# Patient Record
Sex: MALE | Race: OTHER | HISPANIC OR LATINO | URBAN - METROPOLITAN AREA
[De-identification: names, ages, dates, MRNs, and addresses within clinical notes are randomized per-mention and may not be internally consistent; named-entity substitution may affect disease eponyms.]

---

## 2022-03-25 ENCOUNTER — EMERGENCY (EMERGENCY)
Facility: HOSPITAL | Age: 52
LOS: 1 days | Discharge: HOME | End: 2022-03-25
Attending: EMERGENCY MEDICINE
Payer: COMMERCIAL

## 2022-03-25 VITALS
HEART RATE: 85 BPM | OXYGEN SATURATION: 99 % | TEMPERATURE: 98 F | SYSTOLIC BLOOD PRESSURE: 138 MMHG | WEIGHT: 197.09 LBS | DIASTOLIC BLOOD PRESSURE: 82 MMHG | RESPIRATION RATE: 18 BRPM | HEIGHT: 71 IN

## 2022-03-25 PROCEDURE — 99284 EMERGENCY DEPT VISIT MOD MDM: CPT

## 2022-03-26 VITALS
RESPIRATION RATE: 16 BRPM | DIASTOLIC BLOOD PRESSURE: 80 MMHG | OXYGEN SATURATION: 99 % | HEART RATE: 84 BPM | TEMPERATURE: 98 F | SYSTOLIC BLOOD PRESSURE: 142 MMHG

## 2022-03-26 PROCEDURE — 99283 EMERGENCY DEPT VISIT LOW MDM: CPT

## 2022-03-26 RX ORDER — ACETAMINOPHEN 500 MG
975 TABLET ORAL ONCE
Refills: 0 | Status: COMPLETED | OUTPATIENT
Start: 2022-03-26 | End: 2022-03-26

## 2022-03-26 RX ADMIN — Medication 1 TABLET(S): at 01:24

## 2022-03-26 RX ADMIN — Medication 975 MILLIGRAM(S): at 01:35

## 2022-03-26 NOTE — ED PROVIDER NOTE - NS ED ROS FT
Constitutional: no fever and no chills  Eyes: no discharge, no pain, no visual changes  ENMT: no ear pain, no dysphagia or throat pain  Neck: no pain, no stiffness  CV: no chest pain, no palpitations, no edema  Resp: no cough, no shortness of breath  Abd: no abdominal pain, no nausea or vomiting, no diarrhea  : no dysuria, no hematuria  MSK: no back pain, (+) neck pain, no joint pain  Neuro: no LOC, no gait abnormality, (+) headache, no sensory deficits, no weakness  Skin: (+) swelling/redness/discharge back of scalp, no rashes, no lacerations

## 2022-03-26 NOTE — ED PROVIDER NOTE - OBJECTIVE STATEMENT
50 y/o M PMH IDDM, HTN, HLD presenting with 3 days of worsening swellling/erythema/pain to lower back scalp. Pt reports he used a new razor to shave his scalp and noticed a pimple to back to scalp which wife has been intermittently squeezing/draining pus. Swelling and pain have continued to worsen over the past 3 days now radiating down neck and a/w mild HA. He denies any fevers/chills, vision changes, chest pain, SOB, abd pain, n/v/d, joint pain, LE swelling, numbness, weakness or other rashes. Has not taken anything for pain at home or any recent antibiotics.

## 2022-03-26 NOTE — ED PROVIDER NOTE - PATIENT PORTAL LINK FT
You can access the FollowMyHealth Patient Portal offered by [facility] by registering at the following website: [email or web address]. By joining [employer]’s FollowMyHealth portal, you will also be able to view your health information using other applications (apps) compatible with our system.

## 2022-03-26 NOTE — ED PROVIDER NOTE - NSFOLLOWUPINSTRUCTIONS_ED_ALL_ED_FT
You were seen in the ER for swelling and pain to the back of your scalp, which is likely due to cellulitis (skin infection). There was no signs of significant collection of pus that could be drained on our exam. You will be discharged home and should continue to take bactrim for 1 week (1 tablet twice per day for 7 days). Continue to try draining pus from this area daily. Return to the ER for any new or worsening pain/swelling to that area despite antibiotics, fevers/chills, chest pain, difficulty breathing, abdominal pain, vomiting/inability to eat, or any other new or concerning symptoms. You were seen in the ER for swelling and pain to the back of your scalp, which is likely due to cellulitis (skin infection). There was no signs of significant collection of pus that could be drained on our exam. You will be discharged home and should continue to take bactrim for 1 week (1 tablet twice per day for 7 days). Continue to try draining pus from this area daily. Return to the ER for any new or worsening pain/swelling to that area despite antibiotics, fevers/chills, chest pain, difficulty breathing, abdominal pain, vomiting/inability to eat, or any other new or concerning symptoms.    Dr. Louis's Note: no visible abscess, no systemic symptoms, no trauma. while exact etiology is unclear, most likely etiology would seem to be cellulitis and will treat as such. pt stable for outpt f/u, return precautions.

## 2022-03-26 NOTE — ED ADULT NURSE NOTE - OBJECTIVE STATEMENT
51y male, AAOx3, breathing even and unlabored, 51y male, AAOx3, breathing even and unlabored, PMH IDDM, HTN, HLD presenting with 3 days of worsening swellling/erythema/pain to lower back scalp. Pt reports he used a new razor to shave his scalp and noticed a pimple to back to scalp which wife has been intermittently squeezing/draining pus. Swelling and pain have continued to worsen over the past 3 days now radiating down neck and a/w mild HA.

## 2022-03-26 NOTE — ED PROVIDER NOTE - PHYSICAL EXAMINATION
PHYSICAL EXAM:  GENERAL: Sitting comfortable in bed, in no acute distress  HENMT: Atraumatic, moist mucous membranes  EYES: Clear bilaterally, PERRL, EOMs intact b/l  HEART: RRR, S1/S2, no murmurs  RESPIRATORY: Clear to auscultation bilaterally, no wheezes/rhonchi/rales  ABDOMEN: +BS, soft, nontender, nondistended  EXTREMITIES: No lower extremity edema, +2 radial pulses b/l  NEURO:  A&Ox4, no focal motor deficits or sensory deficits   Heme/LYMPH: No ecchymosis or bruising, no anterior/posterior cervical or supraclavicular LAD  SKIN: 5x5cm area of induration lower midline posterior scalp tender and warm to touch with central comedome able to express very small amount of purulence, no fluctuance noted. Small comedome posterior to L ear. No evidence of rash.

## 2022-03-26 NOTE — ED PROVIDER NOTE - CLINICAL SUMMARY MEDICAL DECISION MAKING FREE TEXT BOX
50 y/o M PMH IDDM, HTN, HLD presenting with 3 days of worsening swelling/erythema/pain to posterior scalp after shaving. Initially started as a pimple. Afebrile, no systemic symptoms, hemodynamically stable. 5x5cm area of induration/erythema with central comedome, able to express very small amount of purulence on exam. Bedside US showing no drainable fluid collection. Plan to dc home with 1 week course of abx for cellulitis and strict return precautions.

## 2023-08-08 PROBLEM — E11.9 TYPE 2 DIABETES MELLITUS WITHOUT COMPLICATIONS: Chronic | Status: ACTIVE | Noted: 2022-03-26

## 2023-08-08 PROBLEM — E78.5 HYPERLIPIDEMIA, UNSPECIFIED: Chronic | Status: ACTIVE | Noted: 2022-03-26

## 2023-08-08 PROBLEM — I10 ESSENTIAL (PRIMARY) HYPERTENSION: Chronic | Status: ACTIVE | Noted: 2022-03-26

## 2023-08-25 PROBLEM — Z00.00 ENCOUNTER FOR PREVENTIVE HEALTH EXAMINATION: Status: ACTIVE | Noted: 2023-08-25

## 2023-08-30 ENCOUNTER — APPOINTMENT (OUTPATIENT)
Dept: UROLOGY | Facility: CLINIC | Age: 53
End: 2023-08-30
Payer: COMMERCIAL

## 2023-08-30 VITALS
HEART RATE: 88 BPM | SYSTOLIC BLOOD PRESSURE: 92 MMHG | TEMPERATURE: 97.1 F | WEIGHT: 173 LBS | OXYGEN SATURATION: 99 % | BODY MASS INDEX: 24.22 KG/M2 | HEIGHT: 71 IN | DIASTOLIC BLOOD PRESSURE: 62 MMHG

## 2023-08-30 DIAGNOSIS — F17.290 NICOTINE DEPENDENCE, OTHER TOBACCO PRODUCT, UNCOMPLICATED: ICD-10-CM

## 2023-08-30 PROCEDURE — 99205 OFFICE O/P NEW HI 60 MIN: CPT

## 2023-08-30 NOTE — HISTORY OF PRESENT ILLNESS
[FreeTextEntry1] : 08/30/2023: Mr. BOSTON is a 53 year male who presents today for Pyronine's, Ed, Balanoposthitis   Balanoposthitis: secondary to DM and Jardiance.  advised pt importance of keeping area dry and use nystatin.  Fungal infection in the groin and perineum.  Suggested to use Nystatin skin ointment over all the areas. Avoid using oral antibiotic.  Healing wound on the medial aspect of left thigh. Advised to use Neosporin skin ointment locally.   Pyronine's: has been going on for 6 months . Able to have sex. no pain.   ED: Persistent inability to maintain penile erection   Taking Sildenafil 100 mg Helps.  Reeducated pt on effects   O/E: uncircumcised phallus, severe balanoposthitis. adequate meatus, both testes descended, nontender, no mass palpable   RTC: 2 weeks to evaluate effect of meds.

## 2023-08-30 NOTE — REVIEW OF SYSTEMS
[Constipation] : constipation [Seen by urologist before (Name)  ___] : Preciously seen by a urologist: [unfilled] [Wake up at night to urinate  How many times?  ___] : wakes up to urinate [unfilled] times during the night [Strain or push to urinate] : strain or push to urinate [Interrupted urine stream] : interrupted urine stream [Skin Wound] : skin wound [Negative] : Heme/Lymph [see HPI] : see HPI

## 2023-08-30 NOTE — PHYSICAL EXAM
[General Appearance - Well Developed] : well developed [General Appearance - Well Nourished] : well nourished [Normal Appearance] : normal appearance [Well Groomed] : well groomed [General Appearance - In No Acute Distress] : no acute distress [Edema] : no peripheral edema [Respiration, Rhythm And Depth] : normal respiratory rhythm and effort [Exaggerated Use Of Accessory Muscles For Inspiration] : no accessory muscle use [Abdomen Soft] : soft [Abdomen Tenderness] : non-tender [Costovertebral Angle Tenderness] : no ~M costovertebral angle tenderness [Urethral Meatus] : meatus normal [Penis Abnormality] : normal uncircumcised penis [Urinary Bladder Findings] : the bladder was normal on palpation [Scrotum] : the scrotum was normal [Epididymis] : the epididymides were normal [Testes Tenderness] : no tenderness of the testes [Testes Mass (___cm)] : there were no testicular masses [Normal Station and Gait] : the gait and station were normal for the patient's age [] : no rash [FreeTextEntry1] : multiple fungal infection in the groin and skin infections multiple sites [No Focal Deficits] : no focal deficits [Oriented To Time, Place, And Person] : oriented to person, place, and time [Affect] : the affect was normal [Mood] : the mood was normal [Not Anxious] : not anxious [No Palpable Adenopathy] : no palpable adenopathy

## 2023-08-30 NOTE — LETTER BODY
[Dear  ___] : Dear  [unfilled], [Consult Letter:] : I had the pleasure of evaluating your patient, [unfilled]. [Please see my note below.] : Please see my note below. [Consult Closing:] : Thank you very much for allowing me to participate in the care of this patient.  If you have any questions, please do not hesitate to contact me. [Sincerely,] : Sincerely, [FreeTextEntry3] : Salomon Peters MD

## 2023-09-05 ENCOUNTER — NON-APPOINTMENT (OUTPATIENT)
Age: 53
End: 2023-09-05

## 2023-09-14 ENCOUNTER — APPOINTMENT (OUTPATIENT)
Dept: UROLOGY | Facility: CLINIC | Age: 53
End: 2023-09-14
Payer: COMMERCIAL

## 2023-09-14 VITALS
SYSTOLIC BLOOD PRESSURE: 113 MMHG | OXYGEN SATURATION: 99 % | TEMPERATURE: 98 F | HEART RATE: 74 BPM | DIASTOLIC BLOOD PRESSURE: 71 MMHG

## 2023-09-14 DIAGNOSIS — E11.9 TYPE 2 DIABETES MELLITUS W/OUT COMPLICATIONS: ICD-10-CM

## 2023-09-14 DIAGNOSIS — E78.5 HYPERLIPIDEMIA, UNSPECIFIED: ICD-10-CM

## 2023-09-14 PROCEDURE — 99214 OFFICE O/P EST MOD 30 MIN: CPT

## 2023-09-14 RX ORDER — EMPAGLIFLOZIN 25 MG/1
25 TABLET, FILM COATED ORAL
Refills: 0 | Status: DISCONTINUED | COMMUNITY
End: 2023-09-14

## 2023-10-12 ENCOUNTER — APPOINTMENT (OUTPATIENT)
Dept: ORTHOPEDIC SURGERY | Facility: CLINIC | Age: 53
End: 2023-10-12
Payer: COMMERCIAL

## 2023-10-12 VITALS — HEIGHT: 71 IN | BODY MASS INDEX: 24.22 KG/M2 | WEIGHT: 173 LBS

## 2023-10-12 DIAGNOSIS — M70.60 TROCHANTERIC BURSITIS, UNSPECIFIED HIP: ICD-10-CM

## 2023-10-12 PROCEDURE — 99203 OFFICE O/P NEW LOW 30 MIN: CPT

## 2023-10-12 PROCEDURE — 72170 X-RAY EXAM OF PELVIS: CPT

## 2023-10-12 PROCEDURE — 72100 X-RAY EXAM L-S SPINE 2/3 VWS: CPT

## 2023-10-12 RX ORDER — MELOXICAM 15 MG/1
15 TABLET ORAL
Qty: 30 | Refills: 0 | Status: ACTIVE | COMMUNITY
Start: 2023-10-12 | End: 1900-01-01

## 2023-10-19 ENCOUNTER — EMERGENCY (EMERGENCY)
Facility: HOSPITAL | Age: 53
LOS: 1 days | Discharge: HOME | End: 2023-10-19
Attending: STUDENT IN AN ORGANIZED HEALTH CARE EDUCATION/TRAINING PROGRAM
Payer: COMMERCIAL

## 2023-10-19 VITALS
HEART RATE: 87 BPM | DIASTOLIC BLOOD PRESSURE: 84 MMHG | TEMPERATURE: 98 F | HEIGHT: 71 IN | WEIGHT: 188.94 LBS | SYSTOLIC BLOOD PRESSURE: 159 MMHG | OXYGEN SATURATION: 97 % | RESPIRATION RATE: 20 BRPM

## 2023-10-19 VITALS
DIASTOLIC BLOOD PRESSURE: 82 MMHG | RESPIRATION RATE: 18 BRPM | SYSTOLIC BLOOD PRESSURE: 150 MMHG | TEMPERATURE: 98 F | OXYGEN SATURATION: 98 % | HEART RATE: 85 BPM

## 2023-10-19 PROCEDURE — 99283 EMERGENCY DEPT VISIT LOW MDM: CPT

## 2023-10-19 PROCEDURE — 99284 EMERGENCY DEPT VISIT MOD MDM: CPT

## 2023-10-19 RX ORDER — ACETAMINOPHEN 500 MG
975 TABLET ORAL ONCE
Refills: 0 | Status: COMPLETED | OUTPATIENT
Start: 2023-10-19 | End: 2023-10-19

## 2023-10-19 RX ORDER — MUPIROCIN 20 MG/G
1 OINTMENT TOPICAL ONCE
Refills: 0 | Status: COMPLETED | OUTPATIENT
Start: 2023-10-19 | End: 2023-10-19

## 2023-10-19 RX ADMIN — Medication 975 MILLIGRAM(S): at 06:42

## 2023-10-19 RX ADMIN — Medication 1 TABLET(S): at 06:43

## 2023-10-19 RX ADMIN — MUPIROCIN 1 APPLICATION(S): 20 OINTMENT TOPICAL at 06:48

## 2023-10-19 NOTE — ED PROVIDER NOTE - ATTENDING CONTRIBUTION TO CARE
I have personally performed a face to face medical and diagnostic evaluation of the patient. I have discussed with and reviewed the Resident's note and agree with the History, ROS, Physical Exam and MDM unless otherwise indicated. A brief summary of my personal evaluation and impression can be found below.    53-year-old male with past medical history of diabetes type 2, on metformin, presenting with right groin. Pain and swelling.  States that he has had abscesses in this area in the past.  Denies any fevers, chills, nausea, vomiting.  On exam, vital signs stable, groin exam chaperoned by resident.  On POCUS, no drainable collection noted.  Mild serous drainage coming from punctate area in the area of swelling.  Exam and ultrasound consistent with cellulitis over abscess.  Plan for antibiotics, and follow-up.  Given return precautions and follow-up instructions with teach back.  Plan for DC. Ptera Diaz, ED Attending

## 2023-10-19 NOTE — ED PROVIDER NOTE - PATIENT PORTAL LINK FT
You can access the FollowMyHealth Patient Portal offered by [facility] by registering at the following website: [email or web address]. By joining [employer]’s FollowMyHealth portal, you will also be able to view your health information using other applications (apps) compatible with our system.

## 2023-10-19 NOTE — ED PROVIDER NOTE - PHYSICAL EXAMINATION
GENERAL: NAD, lying in bed comfortably  HEAD:  Atraumatic, Normocephalic  EYES: EOMI, conjunctiva and sclera clear  ENT: Moist mucous membranes  NECK: Supple  CHEST/LUNG: Unlabored respirations. Clear to auscultation bilaterally. No rales, rhonchi, wheezing, or rubs  HEART: Regular rate and rhythm. No murmurs, rubs, or gallops  ABDOMEN: Soft, nondistended, nontender  PELVIC: chaperoned by ED Tech Meghna. R indurated mons pubis nodule 1x1cm. Small pustule on R lateral glute approx [id number].  EXTREMITIES:  No cyanosis or edema. Brisk capillary refill  NERVOUS SYSTEM:  No focal deficits. A&Ox3  SKIN: No rashes or lesions GENERAL: NAD, lying in bed comfortably  HEAD:  Atraumatic, Normocephalic  EYES: EOMI, conjunctiva and sclera clear  ENT: Moist mucous membranes  NECK: Supple  CHEST/LUNG: Unlabored respirations. Clear to auscultation bilaterally. No rales, rhonchi, wheezing, or rubs  HEART: Regular rate and rhythm. No murmurs, rubs, or gallops  ABDOMEN: Soft, nondistended, nontender  PELVIC: chaperoned by ED Tech Meghna. R indurated mons pubis nodule <1x1cm. Small pustule on R lateral glute approx [id number]. No discoloration in groin or in gluteal crease. No crepitus.   EXTREMITIES:  No cyanosis or edema. Brisk capillary refill  NERVOUS SYSTEM:  No focal deficits. A&Ox3

## 2023-10-19 NOTE — ED PROVIDER NOTE - OBJECTIVE STATEMENT
53-year-old male with past medical history of diabetes type 2, on metformin, presenting with right groin abscess for the last 2 days.  Patient reports he has been past history of multiple recurrent abscesses in the past including axilla and back of head, face, and bilateral.  Patient reports that he noted 2 days ago.  Has been painful.  Denies any fever/chills, nausea or vomiting.  Patient reports that he has not shaved in this area.  Patient also endorsing a small new bump on buttock.  Denies any abdominal pain, dysuria.  Otherwise feeling well.

## 2023-10-19 NOTE — ED PROVIDER NOTE - CLINICAL SUMMARY MEDICAL DECISION MAKING FREE TEXT BOX
abscess vs folliculitis. Plan: bactrim, mupirocin, pain medication, hot compresses. I&D if positive US Folliculitis vs early abscess. PE shows small indurated nodule in R groin. Too small to drain. no fluctuance. Plan: will treat with bactrim and nasal mupirocin. will give derm f/u

## 2023-10-19 NOTE — ED PROVIDER NOTE - NSFOLLOWUPINSTRUCTIONS_ED_ALL_ED_FT
You were seen in the emergency department for a skin infection.     1) Follow-up with your primary care provider in 1-5 days. Follow up with dermatology within 1 week.    2) Continue to take all medications as prescribed.   MUPIROCIN OINTMENT: Apply into each nostril (an amount sufficient to cover the top of a cotton swab) twice daily for 5 to 10 days    BACTRIM: was sent to your pharmacy. Please take as prescribed    Use warm compresses on  rash    3) Rest and stay hydrated. Pain can be managed with Acetaminophen (aka Tylenol)  over the counter as directed.    4) Return to the ER for any new or worsening symptoms. Some symptoms to look out for include  redness that starts to expand around wound, streaks coming from the wound, severe worsening pain, fevers, chills, vomiting, or any other worsening concerning symptoms.        Please read all attached patient information.

## 2023-10-19 NOTE — ED ADULT NURSE NOTE - OBJECTIVE STATEMENT
53y Male presents to the ED c/o groin abscess. PMH DM. Pt states he has had abscesses in the past, on his inner thigh, buttock, and under his armpits. Pt presents with abscess that is on his right side pelvis for 3 days. Pt denies fevers. Pt is A&Ox4, and ambulatory. Respirations spontaneous, unlabored, and equal bilaterally. Patient safety maintained, bed is in lowest position, wheels locked, and side rails raised. Patient oriented to call bell, and call bell is within reach.

## 2023-10-19 NOTE — ED ADULT NURSE NOTE - NSFALLUNIVINTERV_ED_ALL_ED
Bed/Stretcher in lowest position, wheels locked, appropriate side rails in place/Call bell, personal items and telephone in reach/Instruct patient to call for assistance before getting out of bed/chair/stretcher/Non-slip footwear applied when patient is off stretcher/[place] to call system/Physically safe environment - no spills, clutter or unnecessary equipment/Purposeful proactive rounding/Room/bathroom lighting operational, light cord in reach

## 2023-10-19 NOTE — ED PROVIDER NOTE - NSFOLLOWUPCLINICS_GEN_ALL_ED_FT
[facility] - [place]  Dermatology  [address], [address]  [place] [address]  Phone: [phone]  Fax: [phone]

## 2023-10-20 ENCOUNTER — NON-APPOINTMENT (OUTPATIENT)
Age: 53
End: 2023-10-20

## 2023-10-23 ENCOUNTER — APPOINTMENT (OUTPATIENT)
Dept: DERMATOLOGY | Facility: CLINIC | Age: 53
End: 2023-10-23
Payer: COMMERCIAL

## 2023-10-23 PROCEDURE — 99204 OFFICE O/P NEW MOD 45 MIN: CPT

## 2023-10-23 RX ORDER — MUPIROCIN 20 MG/G
2 OINTMENT TOPICAL
Qty: 3 | Refills: 3 | Status: ACTIVE | COMMUNITY
Start: 2023-10-23 | End: 1900-01-01

## 2023-10-27 ENCOUNTER — APPOINTMENT (OUTPATIENT)
Dept: DERMATOLOGY | Facility: CLINIC | Age: 53
End: 2023-10-27

## 2023-11-29 ENCOUNTER — NON-APPOINTMENT (OUTPATIENT)
Age: 53
End: 2023-11-29

## 2023-12-14 ENCOUNTER — APPOINTMENT (OUTPATIENT)
Age: 53
End: 2023-12-14

## 2023-12-14 ENCOUNTER — APPOINTMENT (OUTPATIENT)
Age: 53
End: 2023-12-14
Payer: COMMERCIAL

## 2023-12-14 PROCEDURE — D0274: CPT

## 2023-12-14 PROCEDURE — D1110 PROPHYLAXIS - ADULT: CPT

## 2023-12-14 PROCEDURE — D0220: CPT

## 2023-12-14 PROCEDURE — D0120: CPT

## 2023-12-14 PROCEDURE — D0230: CPT

## 2023-12-25 ENCOUNTER — EMERGENCY (EMERGENCY)
Facility: HOSPITAL | Age: 53
LOS: 1 days | Discharge: HOME | End: 2023-12-25
Attending: STUDENT IN AN ORGANIZED HEALTH CARE EDUCATION/TRAINING PROGRAM
Payer: COMMERCIAL

## 2023-12-25 VITALS
SYSTOLIC BLOOD PRESSURE: 137 MMHG | HEIGHT: 71 IN | OXYGEN SATURATION: 100 % | WEIGHT: 188.05 LBS | RESPIRATION RATE: 18 BRPM | TEMPERATURE: 98 F | DIASTOLIC BLOOD PRESSURE: 79 MMHG | HEART RATE: 90 BPM

## 2023-12-25 VITALS
OXYGEN SATURATION: 98 % | SYSTOLIC BLOOD PRESSURE: 138 MMHG | DIASTOLIC BLOOD PRESSURE: 91 MMHG | RESPIRATION RATE: 18 BRPM | TEMPERATURE: 98 F | HEART RATE: 81 BPM

## 2023-12-25 PROCEDURE — 99283 EMERGENCY DEPT VISIT LOW MDM: CPT

## 2023-12-25 PROCEDURE — 99284 EMERGENCY DEPT VISIT MOD MDM: CPT

## 2023-12-25 RX ADMIN — Medication 600 MILLIGRAM(S): at 05:06

## 2023-12-25 NOTE — ED PROVIDER NOTE - CLINICAL SUMMARY MEDICAL DECISION MAKING FREE TEXT BOX
53-year-old male, history of diabetes, recurrent skin infection, abscess, presenting with 1 week onset of right buttock abscess.  Reports 8 out of 10 pain.  Took ibuprofen yesterday.  Denies fever at home.  Reports that he has been following with dermatology clinic. Exam within normal limits arrival physical exam as noted above is unremarkable.  POCUS ultrasound of affected skin lesion did not show any drainable collection.  Will manage symptoms with oral antibiotics, discharged with dermatology follow-up.

## 2023-12-25 NOTE — ED ADULT NURSE NOTE - OBJECTIVE STATEMENT
Pt is a 53y male who presents to [facility] ED from triage c/o of buttocks pain/injury. Pt endorses he has recurrent abscess, a few months ago he had a similar situation on the L side which required him to have the abscess surgically drained and packed, was prescribed clindamycin ointment, about 1 1/2 week ago pt noticed pain in his R buttocks, had wife check and noticed 4 small "pimples" near his R buttocks and as time went on pain increased and pt noticed these 4 pimples formed into one large hard, firm mass on the R outer buttocks region, states pain is unbearable so he came to the ER for eval. Endorses taking x2 ibuprofen @5AM yesterday with minimal relief in pain. PMH DM2(on Metformin). Pt is A&Ox4 currently denying any HA, SOB, CP, palpitations, abd pain, n/v, fever/chills, weakness/fatigue. Pt ambulates independently at baseline. Pt is a 53y male who presents to [facility] ED from triage c/o of buttocks pain/injury. Pt endorses he has recurrent abscess, a few months ago he had a similar situation on the L side which required him to have the abscess surgically drained and packed, was prescribed clindamycin ointment, about 1 1/2 week ago pt noticed pain in his R buttocks, had wife check and noticed 4 small "pimples" near his R buttocks and as time went on pain increased and pt noticed these 4 pimples formed into one large hard, firm mass on the R outer buttocks region, states pain is unbearable so he came to the ER for eval. Endorses taking x2 ibuprofen @5AM yesterday with minimal relief in pain. PMH DM2(on Metformin). Pt is A&Ox4 currently denying any HA, SOB, CP, palpitations, abd pain, n/v, fever/chills, weakness/fatigue. Pt ambulates independently at baseline.

## 2023-12-25 NOTE — ED PROVIDER NOTE - NSFOLLOWUPINSTRUCTIONS_ED_ALL_ED_FT
You came to the emergency room because of right buttock skin infection.      Please take the prescribed antibiotic clindamycin 600 mg 3 times daily for the next 10 days.      Please follow-up with your dermatologist clinic within the next 7 to 14 days for further evaluation and monitoring.      You may take Tylenol 1000 mg every 8 hours (no more than 4000 mg per 24 hours) as needed for pain.   You may take Ibuprofen 400 mg every 6 hours as needed for pain.     Please follow up with your Primary Medical Doctor within the next 7 days to monitor your symptoms.     Please come back to the Emergency Room if your symptoms get worse.

## 2023-12-25 NOTE — ED PROVIDER NOTE - NS ED ROS FT
Constitutional:  (-) fever, (-) chills, (-) lethargy  Eyes:  (-) eye pain (-) visual changes  ENMT: (-) nasal discharge, (-) sore throat. (-) neck pain or stiffness  Cardiac: (-) chest pain (-) palpitations  Respiratory:  (-) cough (-) respiratory distress.   GI:  (-) nausea (-) vomiting (-) diarrhea (-) abdominal pain.  :  (-) dysuria (-) frequency (-) burning (+) R buttock abscess   MS:  (-) back pain (-) joint pain.  Neuro:  (-) headache (-) numbness (-) tingling (-) focal weakness  Skin:  (-) rash  Except as documented in the HPI,  all other systems are negative

## 2023-12-25 NOTE — ED PROVIDER NOTE - OBJECTIVE STATEMENT
53-year-old male, history of diabetes, recurrent skin infection, abscess, presenting with 1 week onset of right buttock abscess.  Reports 8 out of 10 pain.  Took ibuprofen yesterday.  Denies fever at home.  Reports that he has been following with dermatology clinic.

## 2023-12-25 NOTE — ED PROVIDER NOTE - PATIENT PORTAL LINK FT
You can access the FollowMyHealth Patient Portal offered by [facility] by registering at the following website: [email or web address]. By joining [employer]’s FollowMyHealth portal, you will also be able to view your health information using other applications (apps) compatible with our system. You can access the FollowMyHealth Patient Portal offered by [facility] by registering at the following website: [email or web address]. By joining [employer]’s FollowMyHealth portal, you will also be able to view your health information using other applications (apps) compatible with our system.

## 2023-12-25 NOTE — ED PROVIDER NOTE - ATTENDING CONTRIBUTION TO CARE
I have personally performed a face to face medical and diagnostic evaluation of the patient. I have discussed with and reviewed the Resident's note and agree with the History, ROS, Physical Exam and MDM unless otherwise indicated. A brief summary of my personal evaluation and impression can be found below.    53-year-old male, history of diabetes, recurrent skin infection, abscess, presenting with 1 week onset of right buttock abscess.  Reports 8 out of 10 pain.  Took ibuprofen yesterday.  Denies fever at home.  Reports that he has been following with dermatology clinic. On exam, VSS w indurated/tender area of buttock as noted above.  POCUS ultrasound of affected skin lesion did not show any drainable collection.  Will manage symptoms with oral antibiotics and discharge with dermatology follow-up. Petra Diaz, ED Attending

## 2023-12-25 NOTE — ED PROVIDER NOTE - PHYSICAL EXAMINATION
Gen: Patient is well-appearing, NAD, AAOx3, able to ambulate without assistance  HEENT: NCAT, normal conjunctiva, tongue midline, oral mucosa moist  Lung: CTAB, no respiratory distress, no wheezes/rhonchi/rales B/L, speaking in full sentences  CV: RRR, no murmurs, rubs or gallops, distal pulses 2+ b/l  Abd: soft, NT, ND, no guarding, no rigidity, no rebound tenderness  : an approximately 3 x 3 cm R buttock abscess noted   MSK: no visible deformities, ROM normal in UE/LE, no back TTP  Neuro: No focal sensory or motor deficits  Skin: Warm, well perfused, no rash, no leg swelling  Psych: normal affect, calm Gen: Patient is well-appearing, NAD, AAOx3, able to ambulate without assistance  HEENT: NCAT, normal conjunctiva, tongue midline, oral mucosa moist  Lung: CTAB, no respiratory distress, no wheezes/rhonchi/rales B/L, speaking in full sentences  CV: RRR, no murmurs, rubs or gallops, distal pulses 2+ b/l  Abd: soft, NT, ND, no guarding, no rigidity, no rebound tenderness  : an approximately 3 x 3 cm R buttock (gluteal cleft) indurated skin noted, warm to touch, no drainage noted (chaperoned by Dr. Diaz)  POCUS did not show any drainable collection.  Neuro: No focal sensory or motor deficits  Skin: Warm, well perfused, no rash, no leg swelling  Psych: normal affect, calm

## 2023-12-26 ENCOUNTER — TRANSCRIPTION ENCOUNTER (OUTPATIENT)
Age: 53
End: 2023-12-26

## 2024-01-11 ENCOUNTER — APPOINTMENT (OUTPATIENT)
Dept: DERMATOLOGY | Facility: CLINIC | Age: 54
End: 2024-01-11
Payer: COMMERCIAL

## 2024-01-11 ENCOUNTER — LABORATORY RESULT (OUTPATIENT)
Age: 54
End: 2024-01-11

## 2024-01-11 DIAGNOSIS — B95.8 UNSPECIFIED STAPHYLOCOCCUS AS THE CAUSE OF DISEASES CLASSIFIED ELSEWHERE: ICD-10-CM

## 2024-01-11 DIAGNOSIS — L02.92 FURUNCLE, UNSPECIFIED: ICD-10-CM

## 2024-01-11 PROCEDURE — 10180 I&D COMPLEX PO WOUND INFCTJ: CPT

## 2024-01-16 ENCOUNTER — NON-APPOINTMENT (OUTPATIENT)
Age: 54
End: 2024-01-16

## 2024-01-18 ENCOUNTER — APPOINTMENT (OUTPATIENT)
Dept: ORTHOPEDIC SURGERY | Facility: CLINIC | Age: 54
End: 2024-01-18
Payer: COMMERCIAL

## 2024-01-18 VITALS — BODY MASS INDEX: 24.36 KG/M2 | WEIGHT: 174 LBS | HEIGHT: 71 IN

## 2024-01-18 PROCEDURE — 99213 OFFICE O/P EST LOW 20 MIN: CPT

## 2024-01-18 NOTE — ASSESSMENT
[FreeTextEntry1] : 53 year M with lumbar radiculopathy and GT bursitis  - physical therapy, NSAIDs, and MDP   1/18/24: Mainly lumbar radiculopathy  MRI L spine  Follow up after MRI

## 2024-01-18 NOTE — ASSESSMENT
[FreeTextEntry1] : #Recurrent furunculosis and abscesses, reportedly MRSA+ but without culture report on file. Ddx includes hidradenitis suppurative given distribution and lack of improvement with staph decolonization, bleach baths, oral abx and topicals    Chronic condition, flaring  - START Doxycycline 100mg BID, taken with food, for three months. Discussed proper use and possible associated adverse effects, including GI distress, photosensitivity, and hypersensitivity reaction. - C/w hibiclens wash daily and clindamycin  - Will check bacterial culture from left buttocks abscess  - I&D performed of abscess on left buttocks today.   #abscess, complex Incision and drainage performed today. Verbal consent obtained. r/b/a discussed. Area cleansed with etOH. Anesthetized with 1% lido with epi. Incised with 11 blade. Purulent fluid expressed- fluid sent for culture. Wound packed with iodoform gauze and pressure bandage applied. Pt tolerated well. Wound care discussed. Pt to remove packing in 2 days.  RTC 3 months

## 2024-01-18 NOTE — IMAGING
[de-identified] : Constitutional: well developed and well nourished, able to communicate Cardiovascular: Peripheral vascular exam is grossly normal Neurologic: Alert and oriented, no acute distress. Skin: normal skin with no ulcers, rashes, or lesions Pulmonary: No respiratory distress, breathing comfortably on room air Lymphatics: No obvious lymphadenopathy or lymphedema in areas examined  LUMBAR EXAM Alignment: normal Scoliosis: none Spinous process: no tenderness Thoracic paraspinal tenderness: no tenderness Lumbar Paraspinal tenderness: No tenderness +TTP GT  RANGE OF MOTION full with pain   MOTOR EXAM Hip Flexion: 5 /5 Knee Extension: 5 /5 Knee Flexion: 5 /5 Ankle Dorsiflexion: 5 /5 Ankle plantar flexion: 5 /5 Toe Extension: 5 /5  Straight leg sign: negative Sensation intact L2-S1 nerve root distribution Toes warm and well perfused  IMAGING: 10/12/2023 Xrays of the lumbar spine and pelvis were taken demonstrating L5/S1 DDD with retrolisthesis  bilateral hips no signs of fractures, dislocations,or significant arthritis.

## 2024-01-18 NOTE — HISTORY OF PRESENT ILLNESS
[Gradual] : gradual [8] : 8 [5] : 5 [Dull/Aching] : dull/aching [Sharp] : sharp [Stabbing] : stabbing [Frequent] : frequent [Rest] : rest [Meds] : meds [Walking] : walking [de-identified] : 10/12/2023 Mr. XENIA BOSTON is a 53-year male that comes in today with a chief complaint of right hip, pain radiating down the right leg with numbness. prior hx of martial history. + pain with sleeping.  1/18/24: Here for follow up. Has been doing physical therapy since October with only temporary relief. Got 800 ibuprofen with only temporary. Reports numbness in the RLE. Going for massage therapy, states its only temporary relief.  [] : Post Surgical Visit: no [FreeTextEntry1] : right hip  [FreeTextEntry9] : Lidocaine/ Topical Cream/ Ibuprofen [de-identified] : 10/12/23 [de-identified] : Dr. robertson

## 2024-01-18 NOTE — HISTORY OF PRESENT ILLNESS
[FreeTextEntry1] : rpv: recurrent abscesses  [de-identified] : 52 yo M w/ DM2 on metformin here for recurrent  boils x 1 year at least. 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 After LV completed 1 week of DS bactrim with improvement but continues to flare in axilla, buttocks and on abdomen. Has been using clindamycin wipes and hibiclens. Also taking bleach baths almost every other day and applying mupirocin to nares and rectum almost daily. Has new spots near the buttocks that are very painful

## 2024-01-18 NOTE — PHYSICAL EXAM
[FreeTextEntry3] : tender red fluctuant nodules on mons pubis and buttocks hyperpigmented macules on b/l axilla, mons pubis, legs, buttocks

## 2024-01-24 ENCOUNTER — APPOINTMENT (OUTPATIENT)
Dept: MRI IMAGING | Facility: CLINIC | Age: 54
End: 2024-01-24
Payer: COMMERCIAL

## 2024-01-24 PROCEDURE — 72148 MRI LUMBAR SPINE W/O DYE: CPT

## 2024-02-01 ENCOUNTER — APPOINTMENT (OUTPATIENT)
Dept: ORTHOPEDIC SURGERY | Facility: CLINIC | Age: 54
End: 2024-02-01
Payer: COMMERCIAL

## 2024-02-01 VITALS — WEIGHT: 174 LBS | HEIGHT: 71 IN | BODY MASS INDEX: 24.36 KG/M2

## 2024-02-01 PROCEDURE — 99214 OFFICE O/P EST MOD 30 MIN: CPT

## 2024-02-01 NOTE — ASSESSMENT
[FreeTextEntry1] : 53 year M with lumbar radiculopathy and GT bursitis  - physical therapy, NSAIDs, and MDP   1/18/24: Mainly lumbar radiculopathy  MRI L spine  Follow up after MRI   02/01/2024  start with physical therapy MDP HANANE if pain continues with pm

## 2024-02-01 NOTE — IMAGING
[de-identified] : Constitutional: well developed and well nourished, able to communicate Cardiovascular: Peripheral vascular exam is grossly normal Neurologic: Alert and oriented, no acute distress. Skin: normal skin with no ulcers, rashes, or lesions Pulmonary: No respiratory distress, breathing comfortably on room air Lymphatics: No obvious lymphadenopathy or lymphedema in areas examined  LUMBAR EXAM Alignment: normal Scoliosis: none Spinous process: no tenderness Thoracic paraspinal tenderness: no tenderness Lumbar Paraspinal tenderness: No tenderness +TTP GT  RANGE OF MOTION full with pain   MOTOR EXAM Hip Flexion: 5 /5 Knee Extension: 5 /5 Knee Flexion: 5 /5 Ankle Dorsiflexion: 5 /5 Ankle plantar flexion: 5 /5 Toe Extension: 5 /5  Straight leg sign: negative Sensation intact L2-S1 nerve root distribution Toes warm and well perfused  IMAGING: 10/12/2023 Xrays of the lumbar spine and pelvis were taken demonstrating L5/S1 DDD with retrolisthesis  bilateral hips no signs of fractures, dislocations,or significant arthritis.

## 2024-02-01 NOTE — PHYSICAL EXAM
[de-identified] : Constitutional: well developed and well nourished, able to communicate Cardiovascular: Peripheral vascular exam is grossly normal Neurologic: Alert and oriented, no acute distress. Skin: normal skin with no ulcers, rashes, or lesions Pulmonary: No respiratory distress, breathing comfortably on room air Lymphatics: No obvious lymphadenopathy or lymphedema in areas examined  LUMBAR EXAM Alignment: normal Scoliosis: none Spinous process: no tenderness Thoracic paraspinal tenderness: no tenderness Lumbar Paraspinal tenderness: No tenderness +TTP GT  RANGE OF MOTION full with pain   MOTOR EXAM Hip Flexion: 5 /5 Knee Extension: 5 /5 Knee Flexion: 5 /5 Ankle Dorsiflexion: 5 /5 Ankle plantar flexion: 5 /5 Toe Extension: 5 /5  Straight leg sign: negative Sensation intact L2-S1 nerve root distribution Toes warm and well perfused  IMAGING: 10/12/2023 Xrays of the lumbar spine and pelvis were taken demonstrating L5/S1 DDD with retrolisthesis  bilateral hips no signs of fractures, dislocations,or significant arthritis.  MRI L spine: Multiple protruding discs with encroachment of L3/L4/L5 exiting nerve roots

## 2024-02-01 NOTE — HISTORY OF PRESENT ILLNESS
[Gradual] : gradual [8] : 8 [5] : 5 [Dull/Aching] : dull/aching [Sharp] : sharp [Stabbing] : stabbing [Frequent] : frequent [Rest] : rest [Meds] : meds [Walking] : walking [de-identified] : 10/12/2023 Mr. XENIA BOSTON is a 53-year male that comes in today with a chief complaint of right hip, pain radiating down the right leg with numbness. prior hx of martial history. + pain with sleeping.  1/18/24: Here for follow up. Has been doing physical therapy since October with only temporary relief. Got 800 ibuprofen with only temporary. Reports numbness in the RLE. Going for massage therapy, states its only temporary relief.  [] : Post Surgical Visit: no [FreeTextEntry1] : right hip  [FreeTextEntry9] : Lidocaine/ Topical Cream/ Ibuprofen [de-identified] : 10/12/23 [de-identified] : Dr. robertson

## 2024-02-07 ENCOUNTER — APPOINTMENT (OUTPATIENT)
Dept: UROLOGY | Facility: CLINIC | Age: 54
End: 2024-02-07
Payer: COMMERCIAL

## 2024-02-07 VITALS
TEMPERATURE: 97.8 F | OXYGEN SATURATION: 100 % | HEART RATE: 87 BPM | WEIGHT: 189 LBS | BODY MASS INDEX: 26.36 KG/M2 | DIASTOLIC BLOOD PRESSURE: 71 MMHG | SYSTOLIC BLOOD PRESSURE: 118 MMHG

## 2024-02-07 DIAGNOSIS — N52.9 MALE ERECTILE DYSFUNCTION, UNSPECIFIED: ICD-10-CM

## 2024-02-07 DIAGNOSIS — I10 ESSENTIAL (PRIMARY) HYPERTENSION: ICD-10-CM

## 2024-02-07 DIAGNOSIS — N48.6 INDURATION PENIS PLASTICA: ICD-10-CM

## 2024-02-07 DIAGNOSIS — N47.6 BALANOPOSTHITIS: ICD-10-CM

## 2024-02-07 PROCEDURE — 99213 OFFICE O/P EST LOW 20 MIN: CPT

## 2024-02-07 RX ORDER — DOXYCYCLINE HYCLATE 100 MG/1
100 CAPSULE ORAL
Qty: 60 | Refills: 2 | Status: DISCONTINUED | COMMUNITY
Start: 2024-01-11 | End: 2024-02-07

## 2024-02-07 RX ORDER — METFORMIN HYDROCHLORIDE 1000 MG/1
1000 TABLET, COATED ORAL
Refills: 0 | Status: ACTIVE | COMMUNITY

## 2024-02-07 RX ORDER — NYSTATIN 100000 U/G
100000 OINTMENT TOPICAL 3 TIMES DAILY
Qty: 1 | Refills: 3 | Status: ACTIVE | COMMUNITY
Start: 2023-08-30 | End: 1900-01-01

## 2024-02-07 RX ORDER — METHYLPREDNISOLONE 4 MG/1
4 TABLET ORAL
Qty: 1 | Refills: 0 | Status: DISCONTINUED | COMMUNITY
Start: 2023-10-12 | End: 2024-02-07

## 2024-02-07 RX ORDER — SILDENAFIL 20 MG/1
20 TABLET ORAL
Qty: 30 | Refills: 4 | Status: DISCONTINUED | COMMUNITY
Start: 2023-09-14 | End: 2024-02-07

## 2024-02-07 RX ORDER — SILDENAFIL 100 MG/1
100 TABLET, FILM COATED ORAL
Refills: 0 | Status: DISCONTINUED | COMMUNITY
End: 2024-02-07

## 2024-02-07 RX ORDER — PRAVASTATIN SODIUM 80 MG/1
80 TABLET ORAL
Refills: 0 | Status: ACTIVE | COMMUNITY

## 2024-02-07 RX ORDER — METHYLPREDNISOLONE 4 MG/1
4 TABLET ORAL
Qty: 1 | Refills: 1 | Status: DISCONTINUED | COMMUNITY
Start: 2024-02-01 | End: 2024-02-07

## 2024-02-07 RX ORDER — SEMAGLUTIDE 1.34 MG/ML
4 INJECTION, SOLUTION SUBCUTANEOUS
Refills: 0 | Status: ACTIVE | COMMUNITY

## 2024-02-07 RX ORDER — LISINOPRIL 2.5 MG/1
2.5 TABLET ORAL
Refills: 0 | Status: ACTIVE | COMMUNITY

## 2024-02-07 RX ORDER — CLINDAMYCIN PHOSPHATE 10 MG/ML
1 LOTION TOPICAL
Refills: 0 | Status: ACTIVE | COMMUNITY

## 2024-02-07 RX ORDER — SILDENAFIL 100 MG/1
100 TABLET, FILM COATED ORAL
Qty: 30 | Refills: 1 | Status: ACTIVE | COMMUNITY
Start: 2024-02-07 | End: 1900-01-01

## 2024-02-07 NOTE — REVIEW OF SYSTEMS
[Constipation] : constipation [see HPI] : see HPI [Seen by urologist before (Name)  ___] : Preciously seen by a urologist: [unfilled] [Wake up at night to urinate  How many times?  ___] : wakes up to urinate [unfilled] times during the night [Strain or push to urinate] : strain or push to urinate [Interrupted urine stream] : interrupted urine stream [Skin Wound] : skin wound [Negative] : Heme/Lymph

## 2024-02-07 NOTE — PHYSICAL EXAM
[General Appearance - Well Developed] : well developed [General Appearance - Well Nourished] : well nourished [Normal Appearance] : normal appearance [Well Groomed] : well groomed [General Appearance - In No Acute Distress] : no acute distress [Abdomen Soft] : soft [Abdomen Tenderness] : non-tender [Costovertebral Angle Tenderness] : no ~M costovertebral angle tenderness [Urethral Meatus] : meatus normal [Penis Abnormality] : normal uncircumcised penis [Urinary Bladder Findings] : the bladder was normal on palpation [Scrotum] : the scrotum was normal [Epididymis] : the epididymides were normal [Testes Tenderness] : no tenderness of the testes [Testes Mass (___cm)] : there were no testicular masses [Edema] : no peripheral edema [] : no respiratory distress [Respiration, Rhythm And Depth] : normal respiratory rhythm and effort [Exaggerated Use Of Accessory Muscles For Inspiration] : no accessory muscle use [Oriented To Time, Place, And Person] : oriented to person, place, and time [Affect] : the affect was normal [Mood] : the mood was normal [Not Anxious] : not anxious [Normal Station and Gait] : the gait and station were normal for the patient's age [No Focal Deficits] : no focal deficits [No Palpable Adenopathy] : no palpable adenopathy [FreeTextEntry1] : multiple fungal infection in the groin and skin infections multiple sites

## 2024-02-07 NOTE — HISTORY OF PRESENT ILLNESS
[FreeTextEntry1] : 08/30/2023: Mr. BOSTON is a 53 year male who presents today for Pyronine's, Ed, Balanoposthitis   Balanoposthitis: secondary to DM and Jardiance.  advised pt importance of keeping area dry and use nystatin.  Fungal infection in the groin and perineum.  Suggested to use Nystatin skin ointment over all the areas. Avoid using oral antibiotic.  Healing wound on the medial aspect of left thigh. Advised to use Neosporin skin ointment locally.   Pyronine's: has been going on for 6 months . Able to have sex. no pain.   ED: Persistent inability to maintain penile erection   Taking Sildenafil 100 mg Helps.  Reeducated pt on effects   O/E: uncircumcised phallus, severe balanoposthitis. adequate meatus, both testes descended, nontender, no mass palpable   RTC: 2 weeks to evaluate effect of meds.      09/14/2023: Mr. BOSTON is a 53 year male who presents today for a follow up for Balanoposthitis   Balanoposthitis: secondary to DM and Jardiance.   Jardiance discontinued. Fungal infection in the groin and perineum.  Has been using Nystatin.  Fungal infection resolved.   ED: does not last long. Gets soft. Will start on Sildenafil 20 mg po daily PRN.   RTC: 3 months to evaluate the effect of Sildenafil.       02/07/2024: Mr. BOSTON is a 53 year male who presents today for a follow up for Balanitis and erectile disfunction   Balanitis:  Using Nystatin. Stating Balanitis is much better O/E: looks better  Refilled Nystatin. Will continue   ED:  On Sildenafil 20 mg PO PRN. Pt is stating he titrated, and took 4 pills, and still did not help. Has DM, hypertension, hyperlipidemia. Reeducated pt on effects and how the medication works.  Pt requested higher dose. Prescribed Sildenafil 100 mg PO PRN today and will f/u in 6 months to reevaluate effects  RTC: 6 months to reevaluate effects of Sildenafil 100 mg

## 2024-02-12 ENCOUNTER — APPOINTMENT (OUTPATIENT)
Dept: PAIN MANAGEMENT | Facility: CLINIC | Age: 54
End: 2024-02-12
Payer: COMMERCIAL

## 2024-02-12 VITALS — WEIGHT: 186 LBS | BODY MASS INDEX: 26.04 KG/M2 | HEIGHT: 71 IN

## 2024-02-12 PROCEDURE — 99244 OFF/OP CNSLTJ NEW/EST MOD 40: CPT

## 2024-02-12 NOTE — PHYSICAL EXAM
[de-identified] : Constitutional; Appears well, no apparent distress Ability to communicate: Normal  Respiratory: non-labored breathing Skin: No rash noted Head: Normocephalic, atraumatic Neck: no visible thyroid enlargement Eyes: Extraocular movements intact Neurologic: Alert and oriented x3 Psychiatric: normal mood, affect and behavior [] : non-antalgic

## 2024-02-12 NOTE — HISTORY OF PRESENT ILLNESS
[Lower back] : lower back [8] : 8 [Dull/Aching] : dull/aching [Sharp] : sharp [Shooting] : shooting [Intermittent] : intermittent [Household chores] : household chores [Nothing helps with pain getting better] : Nothing helps with pain getting better [Sitting] : sitting [Standing] : standing [Walking] : walking [Lying in bed] : lying in bed [FreeTextEntry1] : Initial HPI 02/12/2024: Pain started a few months ago and is on the RIGHT side of the lower back and radiates into the right buttock and down the right lateral thigh described as a sharp shooting pain with associated numbness.   MRI Lumbar Spine 1/24/24 independently reviewed: L3-4 right HNP with right L4 encroachment; L4-5 right HNP with R>L L5 encroachment; L5-S1 left HNP withe left S1 encroachment.  Conservative Care: has done PT which helped.  Pain Medications: Motrin PRN  Past Injections: none Spine surgery: none  Blood thinners: none  [] : no [FreeTextEntry6] : NUMBNESS  [FreeTextEntry7] : RIGHT HIP AND LEG  [de-identified] : L MRI

## 2024-02-12 NOTE — DISCUSSION/SUMMARY
[de-identified] : After discussing various treatment options with the patient including but not limited to oral medications, physical therapy, exercise modalities as well as interventional spinal injections, we have decided with the following plan:  - Continue Home exercises, stretching, activity modification, physical therapy, and conservative care. - MRI report and/or images was reviewed and discussed with the patient. - Recommend Right L3-4, L4-5 Transforaminal Epidural Steroid Injection under fluoroscopic guidance with image. - The risks, benefits and alternatives of the proposed procedure were explained in detail with the patient. The risks outlined include but are not limited to infection, bleeding, post-dural puncture headache, nerve injury, a temporary increase in pain, failure to resolve symptoms, allergic reaction, symptom recurrence, and possible elevation of blood sugar in diabetics. All questions were answered to patient's apparent satisfaction and he/she verbalized an understanding. - Patient is presenting with acute/sub-acute radicular pain with impairment in ADLs and functionality.  The pain has not responded to conservative care including NSAID therapy and/or physical therapy.  There is no bleeding tendency, unstable medical condition, or systemic infection. - Follow up in 1-2 weeks post injection for re-evaluation.

## 2024-02-20 ENCOUNTER — APPOINTMENT (OUTPATIENT)
Dept: ORTHOPEDIC SURGERY | Facility: CLINIC | Age: 54
End: 2024-02-20
Payer: COMMERCIAL

## 2024-02-20 VITALS — BODY MASS INDEX: 26.04 KG/M2 | HEIGHT: 71 IN | WEIGHT: 186 LBS

## 2024-02-20 PROCEDURE — 99213 OFFICE O/P EST LOW 20 MIN: CPT

## 2024-02-20 NOTE — ASSESSMENT
[FreeTextEntry1] : 53 year M with lumbar radiculopathy and GT bursitis  - physical therapy, NSAIDs, and MDP   1/18/24: Mainly lumbar radiculopathy  MRI L spine  Follow up after MRI   02/01/2024  start with physical therapy MDP HANANE if pain continues with pm  02/20/2024 arranged for injection with Dr. Hernandez on the feb 28th. deferred GT bursa PT and injection today.  will see patient back in 1 month after injection to determine if he needs pt vs GT bursitis injection

## 2024-02-20 NOTE — HISTORY OF PRESENT ILLNESS
[Gradual] : gradual [8] : 8 [5] : 5 [Dull/Aching] : dull/aching [Sharp] : sharp [Stabbing] : stabbing [Frequent] : frequent [Rest] : rest [Meds] : meds [Walking] : walking [de-identified] : 10/12/2023 Mr. XENIA BOSTON is a 53-year male that comes in today with a chief complaint of right hip, pain radiating down the right leg with numbness. prior hx of martial history. + pain with sleeping.  1/18/24: Here for follow up. Has been doing physical therapy since October with only temporary relief. Got 800 ibuprofen with only temporary. Reports numbness in the RLE. Going for massage therapy, states its only temporary relief.  [] : Post Surgical Visit: no [FreeTextEntry1] : right hip  [FreeTextEntry9] : Lidocaine/ Topical Cream/ Ibuprofen [de-identified] : 10/12/23 [de-identified] : Dr. robertson

## 2024-02-20 NOTE — IMAGING
[de-identified] : Constitutional: well developed and well nourished, able to communicate Cardiovascular: Peripheral vascular exam is grossly normal Neurologic: Alert and oriented, no acute distress. Skin: normal skin with no ulcers, rashes, or lesions Pulmonary: No respiratory distress, breathing comfortably on room air Lymphatics: No obvious lymphadenopathy or lymphedema in areas examined  LUMBAR EXAM Alignment: normal Scoliosis: none Spinous process: no tenderness Thoracic paraspinal tenderness: no tenderness Lumbar Paraspinal tenderness: No tenderness +TTP GT  RANGE OF MOTION full with pain   MOTOR EXAM Hip Flexion: 5 /5 Knee Extension: 5 /5 Knee Flexion: 5 /5 Ankle Dorsiflexion: 5 /5 Ankle plantar flexion: 5 /5 Toe Extension: 5 /5  Straight leg sign: negative Sensation intact L2-S1 nerve root distribution Toes warm and well perfused  IMAGING: 10/12/2023 Xrays of the lumbar spine and pelvis were taken demonstrating L5/S1 DDD with retrolisthesis  bilateral hips no signs of fractures, dislocations,or significant arthritis.

## 2024-02-20 NOTE — PHYSICAL EXAM
[de-identified] : Constitutional: well developed and well nourished, able to communicate Cardiovascular: Peripheral vascular exam is grossly normal Neurologic: Alert and oriented, no acute distress. Skin: normal skin with no ulcers, rashes, or lesions Pulmonary: No respiratory distress, breathing comfortably on room air Lymphatics: No obvious lymphadenopathy or lymphedema in areas examined  LUMBAR EXAM Alignment: normal Scoliosis: none Spinous process: no tenderness Thoracic paraspinal tenderness: no tenderness Lumbar Paraspinal tenderness: No tenderness +TTP GT  RANGE OF MOTION full with pain   MOTOR EXAM Hip Flexion: 5 /5 Knee Extension: 5 /5 Knee Flexion: 5 /5 Ankle Dorsiflexion: 5 /5 Ankle plantar flexion: 5 /5 Toe Extension: 5 /5  Straight leg sign: negative Sensation intact L2-S1 nerve root distribution Toes warm and well perfused  IMAGING: 10/12/2023 Xrays of the lumbar spine and pelvis were taken demonstrating L5/S1 DDD with retrolisthesis  bilateral hips no signs of fractures, dislocations,or significant arthritis.  MRI L spine: Multiple protruding discs with encroachment of L3/L4/L5 exiting nerve roots

## 2024-02-28 ENCOUNTER — APPOINTMENT (OUTPATIENT)
Age: 54
End: 2024-02-28

## 2024-02-29 ENCOUNTER — NON-APPOINTMENT (OUTPATIENT)
Age: 54
End: 2024-02-29

## 2024-03-13 ENCOUNTER — APPOINTMENT (OUTPATIENT)
Age: 54
End: 2024-03-13
Payer: COMMERCIAL

## 2024-03-13 PROCEDURE — 64484 NJX AA&/STRD TFRM EPI L/S EA: CPT | Mod: 59,RT

## 2024-03-13 PROCEDURE — 64483 NJX AA&/STRD TFRM EPI L/S 1: CPT | Mod: RT

## 2024-03-19 ENCOUNTER — NON-APPOINTMENT (OUTPATIENT)
Age: 54
End: 2024-03-19

## 2024-03-19 ENCOUNTER — APPOINTMENT (OUTPATIENT)
Dept: PULMONOLOGY | Facility: CLINIC | Age: 54
End: 2024-03-19
Payer: COMMERCIAL

## 2024-03-19 VITALS
WEIGHT: 190 LBS | SYSTOLIC BLOOD PRESSURE: 118 MMHG | DIASTOLIC BLOOD PRESSURE: 81 MMHG | BODY MASS INDEX: 26.6 KG/M2 | HEART RATE: 92 BPM | HEIGHT: 71 IN | OXYGEN SATURATION: 99 % | TEMPERATURE: 98.4 F

## 2024-03-19 DIAGNOSIS — F17.200 NICOTINE DEPENDENCE, UNSPECIFIED, UNCOMPLICATED: ICD-10-CM

## 2024-03-19 PROCEDURE — 99204 OFFICE O/P NEW MOD 45 MIN: CPT

## 2024-03-19 RX ORDER — BENZOCAINE/MENTH/CETYLPYRD CL 15 MG-2 MG
10-2.1 LOZENGE MUCOUS MEMBRANE
Qty: 1 | Refills: 0 | Status: ACTIVE | COMMUNITY
Start: 2024-03-19 | End: 1900-01-01

## 2024-03-19 RX ORDER — AZITHROMYCIN 500 MG/1
500 TABLET, FILM COATED ORAL DAILY
Qty: 7 | Refills: 0 | Status: ACTIVE | COMMUNITY
Start: 2024-03-19 | End: 1900-01-01

## 2024-03-19 NOTE — PHYSICAL EXAM
[No Acute Distress] : no acute distress [Well Nourished] : well nourished [Well Developed] : well developed [Normal Oropharynx] : normal oropharynx [No Resp Distress] : no resp distress [No Acc Muscle Use] : no acc muscle use [Clear to Auscultation Bilaterally] : clear to auscultation bilaterally [No Focal Deficits] : no focal deficits [Oriented x3] : oriented x3 [TextBox_11] : no erythema. no exudate.

## 2024-03-19 NOTE — HISTORY OF PRESENT ILLNESS
[Current] : current [Never] : never [TextBox_4] : XENIA BOSTON is a 53 year old male who presents with sore throat X 7 days some chills no sick contact cigar smoker + sometimes tobacco cigraette smoker  no cough no wheezing no sick contact   [TextBox_29] : cigar

## 2024-03-20 LAB — S PYO DNA THROAT QL NAA+PROBE: NOT DETECTED

## 2024-03-21 ENCOUNTER — APPOINTMENT (OUTPATIENT)
Dept: ORTHOPEDIC SURGERY | Facility: CLINIC | Age: 54
End: 2024-03-21
Payer: COMMERCIAL

## 2024-03-21 VITALS — BODY MASS INDEX: 26.6 KG/M2 | HEIGHT: 71 IN | WEIGHT: 190 LBS

## 2024-03-21 LAB
INFLUENZA A RESULT: NOT DETECTED
INFLUENZA B RESULT: NOT DETECTED
RESP SYN VIRUS RESULT: NOT DETECTED
SARS-COV-2 RESULT: NOT DETECTED

## 2024-03-21 PROCEDURE — 99213 OFFICE O/P EST LOW 20 MIN: CPT

## 2024-03-21 NOTE — ASSESSMENT
[FreeTextEntry1] : 53 year M with lumbar radiculopathy and GT bursitis  - physical therapy, NSAIDs, and MDP   1/18/24: Mainly lumbar radiculopathy  MRI L spine  Follow up after MRI   02/01/2024  start with physical therapy MDP HANANE if pain continues with pm  02/20/2024 arranged for injection with Dr. Hernandez on the feb 28th. deferred GT bursa PT and injection today.  will see patient back in 1 month after injection to determine if he needs pt vs GT bursitis injection  03/21/2024 HANANE with Dr. Hernandez is working for right hip defer any injections as majority of pain is coming from the back fu with Dr. Hernandez as needed.

## 2024-03-21 NOTE — PHYSICAL EXAM
[de-identified] : Constitutional: well developed and well nourished, able to communicate Cardiovascular: Peripheral vascular exam is grossly normal Neurologic: Alert and oriented, no acute distress. Skin: normal skin with no ulcers, rashes, or lesions Pulmonary: No respiratory distress, breathing comfortably on room air Lymphatics: No obvious lymphadenopathy or lymphedema in areas examined  LUMBAR EXAM Alignment: normal Scoliosis: none Spinous process: no tenderness Thoracic paraspinal tenderness: no tenderness Lumbar Paraspinal tenderness: No tenderness +TTP GT  RANGE OF MOTION full with pain   MOTOR EXAM Hip Flexion: 5 /5 Knee Extension: 5 /5 Knee Flexion: 5 /5 Ankle Dorsiflexion: 5 /5 Ankle plantar flexion: 5 /5 Toe Extension: 5 /5  Straight leg sign: negative Sensation intact L2-S1 nerve root distribution Toes warm and well perfused  IMAGING: 10/12/2023 Xrays of the lumbar spine and pelvis were taken demonstrating L5/S1 DDD with retrolisthesis  bilateral hips no signs of fractures, dislocations,or significant arthritis.  MRI L spine: Multiple protruding discs with encroachment of L3/L4/L5 exiting nerve roots

## 2024-03-21 NOTE — IMAGING
[de-identified] : Constitutional: well developed and well nourished, able to communicate Cardiovascular: Peripheral vascular exam is grossly normal Neurologic: Alert and oriented, no acute distress. Skin: normal skin with no ulcers, rashes, or lesions Pulmonary: No respiratory distress, breathing comfortably on room air Lymphatics: No obvious lymphadenopathy or lymphedema in areas examined  LUMBAR EXAM Alignment: normal Scoliosis: none Spinous process: no tenderness Thoracic paraspinal tenderness: no tenderness Lumbar Paraspinal tenderness: No tenderness minimal TTP GT  RANGE OF MOTION full with pain   MOTOR EXAM Hip Flexion: 5 /5 Knee Extension: 5 /5 Knee Flexion: 5 /5 Ankle Dorsiflexion: 5 /5 Ankle plantar flexion: 5 /5 Toe Extension: 5 /5  Straight leg sign: negative Sensation intact L2-S1 nerve root distribution Toes warm and well perfused  IMAGING: 10/12/2023 Xrays of the lumbar spine and pelvis were taken demonstrating L5/S1 DDD with retrolisthesis  bilateral hips no signs of fractures, dislocations,or significant arthritis.

## 2024-03-21 NOTE — HISTORY OF PRESENT ILLNESS
[Gradual] : gradual [8] : 8 [5] : 5 [Dull/Aching] : dull/aching [Sharp] : sharp [Stabbing] : stabbing [Frequent] : frequent [Rest] : rest [Meds] : meds [Walking] : walking [de-identified] : 10/12/2023 Mr. XENIA BOSTON is a 53-year male that comes in today with a chief complaint of right hip, pain radiating down the right leg with numbness. prior hx of martial history. + pain with sleeping.  1/18/24: Here for follow up. Has been doing physical therapy since October with only temporary relief. Got 800 ibuprofen with only temporary. Reports numbness in the RLE. Going for massage therapy, states its only temporary relief.   03/21/2024 justo HANANE March 13 and feeling better. [] : Post Surgical Visit: no [FreeTextEntry1] : right hip  [FreeTextEntry9] : Lidocaine/ Topical Cream/ Ibuprofen [de-identified] : 10/12/23 [de-identified] : Dr. robertson [de-identified] : none

## 2024-03-29 ENCOUNTER — APPOINTMENT (OUTPATIENT)
Dept: PAIN MANAGEMENT | Facility: CLINIC | Age: 54
End: 2024-03-29
Payer: COMMERCIAL

## 2024-03-29 VITALS — WEIGHT: 190 LBS | HEIGHT: 71 IN | BODY MASS INDEX: 26.6 KG/M2

## 2024-03-29 DIAGNOSIS — M54.16 RADICULOPATHY, LUMBAR REGION: ICD-10-CM

## 2024-03-29 PROCEDURE — 99214 OFFICE O/P EST MOD 30 MIN: CPT

## 2024-03-29 NOTE — HISTORY OF PRESENT ILLNESS
[Lower back] : lower back [Dull/Aching] : dull/aching [Sharp] : sharp [Shooting] : shooting [Household chores] : household chores [Nothing helps with pain getting better] : Nothing helps with pain getting better [Sitting] : sitting [Standing] : standing [Walking] : walking [Lying in bed] : lying in bed [Buttock] : buttock [0] : 0 [Occasional] : occasional [FreeTextEntry1] : 03/29/2024: s/p RIGHT L3-4 L4-5 TFESI on 03/13/24 with >90% relief and improvement of ADLs. Radicular pain is completely resolved. Has intermittent pain in the right buttock but much less severe.   Initial HPI 02/12/2024: Pain started a few months ago and is on the RIGHT side of the lower back and radiates into the right buttock and down the right lateral thigh described as a sharp shooting pain with associated numbness.   MRI Lumbar Spine 1/24/24 independently reviewed: L3-4 right HNP with right L4 encroachment; L4-5 right HNP with R>L L5 encroachment; L5-S1 left HNP withe left S1 encroachment.  Conservative Care: has done PT which helped.  Pain Medications: Motrin PRN  Past Injections: none Spine surgery: none  Blood thinners: none  [FreeTextEntry6] : NUMBNESS  [] : no [FreeTextEntry7] : RIGHT HIP AND LEG  [de-identified] : L MRI  [TWNoteComboBox1] : 100%

## 2024-03-29 NOTE — DISCUSSION/SUMMARY
[de-identified] : After discussing various treatment options with the patient including but not limited to oral medications, physical therapy, exercise modalities as well as interventional spinal injections, we have decided with the following plan:  - Continue Home exercises, stretching, activity modification, physical therapy, and conservative care. - MRI report and/or images was reviewed and discussed with the patient. - Recommend Right L3-4, L4-5 Transforaminal Epidural Steroid Injection under fluoroscopic guidance with image. - The risks, benefits and alternatives of the proposed procedure were explained in detail with the patient. The risks outlined include but are not limited to infection, bleeding, post-dural puncture headache, nerve injury, a temporary increase in pain, failure to resolve symptoms, allergic reaction, symptom recurrence, and possible elevation of blood sugar in diabetics. All questions were answered to patient's apparent satisfaction and he/she verbalized an understanding. - Patient is presenting with acute/sub-acute radicular pain with impairment in ADLs and functionality.  The pain has not responded to conservative care including NSAID therapy and/or physical therapy.  There is no bleeding tendency, unstable medical condition, or systemic infection. - Follow up in 1-2 weeks post injection for re-evaluation. - Will call to schedule. - Will provide prescription for Physical Therapy.

## 2024-03-29 NOTE — PHYSICAL EXAM
[de-identified] : Constitutional; Appears well, no apparent distress Ability to communicate: Normal  Respiratory: non-labored breathing Skin: No rash noted Head: Normocephalic, atraumatic Neck: no visible thyroid enlargement Eyes: Extraocular movements intact Neurologic: Alert and oriented x3 Psychiatric: normal mood, affect and behavior [] : non-antalgic

## 2024-04-11 ENCOUNTER — APPOINTMENT (OUTPATIENT)
Dept: PULMONOLOGY | Facility: CLINIC | Age: 54
End: 2024-04-11
Payer: COMMERCIAL

## 2024-04-11 VITALS
SYSTOLIC BLOOD PRESSURE: 109 MMHG | OXYGEN SATURATION: 97 % | TEMPERATURE: 97.2 F | HEART RATE: 93 BPM | WEIGHT: 195 LBS | DIASTOLIC BLOOD PRESSURE: 74 MMHG | HEIGHT: 71 IN | BODY MASS INDEX: 27.3 KG/M2

## 2024-04-11 DIAGNOSIS — J02.9 ACUTE PHARYNGITIS, UNSPECIFIED: ICD-10-CM

## 2024-04-11 PROCEDURE — 99214 OFFICE O/P EST MOD 30 MIN: CPT

## 2024-04-11 RX ORDER — PANTOPRAZOLE 40 MG/1
40 TABLET, DELAYED RELEASE ORAL
Qty: 30 | Refills: 0 | Status: ACTIVE | COMMUNITY
Start: 2024-04-11 | End: 1900-01-01

## 2024-04-11 NOTE — HISTORY OF PRESENT ILLNESS
[Current] : current [Never] : never [TextBox_4] : XENIA BOSTON is a 53 year old male who presents for sore throat seen last month for sore throat itnermittently better     cigar smoker + sometimes tobacco cigraette smoker  no cough no wheezing no sick contact   [TextBox_29] : cigar

## 2024-04-12 LAB
RAPID RVP RESULT: NOT DETECTED
SARS-COV-2 RNA PNL RESP NAA+PROBE: NOT DETECTED

## 2024-05-16 ENCOUNTER — APPOINTMENT (OUTPATIENT)
Dept: PULMONOLOGY | Facility: CLINIC | Age: 54
End: 2024-05-16
Payer: COMMERCIAL

## 2024-05-16 VITALS
OXYGEN SATURATION: 99 % | TEMPERATURE: 97.6 F | DIASTOLIC BLOOD PRESSURE: 69 MMHG | HEART RATE: 85 BPM | SYSTOLIC BLOOD PRESSURE: 109 MMHG

## 2024-05-16 VITALS — HEIGHT: 71 IN | BODY MASS INDEX: 27.3 KG/M2 | WEIGHT: 195 LBS

## 2024-05-16 DIAGNOSIS — R93.89 ABNORMAL FINDINGS ON DIAGNOSTIC IMAGING OF OTHER SPECIFIED BODY STRUCTURES: ICD-10-CM

## 2024-05-16 DIAGNOSIS — J02.9 ACUTE PHARYNGITIS, UNSPECIFIED: ICD-10-CM

## 2024-05-16 PROCEDURE — 71046 X-RAY EXAM CHEST 2 VIEWS: CPT

## 2024-05-16 PROCEDURE — 94010 BREATHING CAPACITY TEST: CPT

## 2024-05-16 PROCEDURE — 99214 OFFICE O/P EST MOD 30 MIN: CPT | Mod: 25

## 2024-05-16 NOTE — PROCEDURE
[FreeTextEntry1] : 5/2024 normal spirometry  PA and lateral chest xray performed using standard projections. Bones and soft tissues structures are unremarkable.Cardiac silhouette appears normal. Lung fields show small nodular opacities perihilar  No infiltrate or masses noted. Mediastinal contours are normal. IMPRESSION:  Lung fields show small nodular opacities perihila

## 2024-05-24 ENCOUNTER — APPOINTMENT (OUTPATIENT)
Dept: CT IMAGING | Facility: IMAGING CENTER | Age: 54
End: 2024-05-24
Payer: COMMERCIAL

## 2024-05-24 ENCOUNTER — OUTPATIENT (OUTPATIENT)
Dept: OUTPATIENT SERVICES | Facility: HOSPITAL | Age: 54
LOS: 1 days | End: 2024-05-24
Payer: COMMERCIAL

## 2024-05-24 DIAGNOSIS — J02.9 ACUTE PHARYNGITIS, UNSPECIFIED: ICD-10-CM

## 2024-05-24 PROCEDURE — 71250 CT THORAX DX C-: CPT

## 2024-05-24 PROCEDURE — 71250 CT THORAX DX C-: CPT | Mod: 26

## 2024-06-20 ENCOUNTER — APPOINTMENT (OUTPATIENT)
Age: 54
End: 2024-06-20

## 2024-06-20 ENCOUNTER — APPOINTMENT (OUTPATIENT)
Age: 54
End: 2024-06-20
Payer: COMMERCIAL

## 2024-06-20 PROCEDURE — D1110 PROPHYLAXIS - ADULT: CPT

## 2024-06-20 PROCEDURE — D0120: CPT

## 2024-06-27 ENCOUNTER — APPOINTMENT (OUTPATIENT)
Dept: PULMONOLOGY | Facility: CLINIC | Age: 54
End: 2024-06-27
Payer: COMMERCIAL

## 2024-06-27 VITALS
BODY MASS INDEX: 27.3 KG/M2 | DIASTOLIC BLOOD PRESSURE: 72 MMHG | HEART RATE: 80 BPM | TEMPERATURE: 98.3 F | SYSTOLIC BLOOD PRESSURE: 105 MMHG | OXYGEN SATURATION: 99 % | WEIGHT: 195 LBS | HEIGHT: 71 IN

## 2024-06-27 DIAGNOSIS — J84.10 PULMONARY FIBROSIS, UNSPECIFIED: ICD-10-CM

## 2024-06-27 PROCEDURE — 99214 OFFICE O/P EST MOD 30 MIN: CPT

## 2024-06-27 PROCEDURE — G2211 COMPLEX E/M VISIT ADD ON: CPT

## 2024-08-08 ENCOUNTER — APPOINTMENT (OUTPATIENT)
Dept: UROLOGY | Facility: CLINIC | Age: 54
End: 2024-08-08

## 2024-08-08 PROCEDURE — 99213 OFFICE O/P EST LOW 20 MIN: CPT

## 2024-08-08 NOTE — END OF VISIT
[FreeTextEntry4] :  This note was written by Haylie Oneal on 08/08/2024 actively solely Salomon Ansari M.D. I, Haylie Oneal, am scribing for and in the presence of Salomon Ansari M.D. in the following sections HISTORY OF PRESENT ILLNESS, PAST MEDICAL/FAMILY/SOCIAL HISTORY; REVIEW OF SYSTEMS; VITAL SIGNS; PHYSICAL EXAM; ASSESSMENT/PLAN.     All medical record entries made by this scribe at my, Salomon Ansari M.D. direction and personally dictated by me on 08/08/2024. I personally performed the services described in the documentation, reviewed the documentation recorded by the scribe in my presence, and it accurately and completely records my words and actions. [Time Spent: ___ minutes] : I have spent [unfilled] minutes of time on the encounter.

## 2024-08-08 NOTE — HISTORY OF PRESENT ILLNESS
[FreeTextEntry1] : 08/30/2023: Mr. BOSTON is a 53 year male who presents today for Pyronine's, Ed, Balanoposthitis   Balanoposthitis: secondary to DM and Jardiance.  advised pt importance of keeping area dry and use nystatin.  Fungal infection in the groin and perineum.  Suggested to use Nystatin skin ointment over all the areas. Avoid using oral antibiotic.  Healing wound on the medial aspect of left thigh. Advised to use Neosporin skin ointment locally.   Pyronine's: has been going on for 6 months . Able to have sex. no pain.   ED: Persistent inability to maintain penile erection   Taking Sildenafil 100 mg Helps.  Reeducated pt on effects   O/E: uncircumcised phallus, severe balanoposthitis. adequate meatus, both testes descended, nontender, no mass palpable   RTC: 2 weeks to evaluate effect of meds.      09/14/2023: Mr. BOSTON is a 53 year male who presents today for a follow up for Balanoposthitis   Balanoposthitis: secondary to DM and Jardiance.   Jardiance discontinued. Fungal infection in the groin and perineum.  Has been using Nystatin.  Fungal infection resolved.   ED: does not last long. Gets soft. Will start on Sildenafil 20 mg po daily PRN.   RTC: 3 months to evaluate the effect of Sildenafil.       02/07/2024: Mr. BOSTON is a 53 year male who presents today for a follow up for Balanitis and erectile disfunction   Balanitis:  Using Nystatin. Stating Balanitis is much better O/E: looks better  Refilled Nystatin. Will continue   ED:  On Sildenafil 20 mg PO PRN. Pt is stating he titrated, and took 4 pills, and still did not help. Has DM, hypertension, hyperlipidemia. Reeducated pt on effects and how the medication works.  Pt requested higher dose. Prescribed Sildenafil 100 mg PO PRN today and will f/u in 6 months to reevaluate effects  RTC: 6 months to reevaluate effects of Sildenafil 100 mg      08/08/2024 Pt is a 54 y/o male who presents for a follow up visit for Balanitis, ED and Pyronine's Disease  Balanitis:  Pt is using Nystatin. He reports Balanitis is clearing up. Pt reeducated on proper care, hygiene and importance of keeping area dry.  O/E: looks better   Will continue Nystatin.  ED:  On Sildenafil 100 mg PO PRN. Pt reports he is doing well no issues at this time. Will continue sildenafil 100 MG  Rx for Sildenafil 100 MG given today.  Pyronine's: Mild curvature. Pt is able to have sex. no issues or no pain at this time  UA and culture taken today  PMHx: Has DM, hypertension, hyperlipidemia.  RTC 1 year  follow up for visit PSA, Uroflow, PVR, and UA and Culture

## 2024-09-09 ENCOUNTER — RX RENEWAL (OUTPATIENT)
Age: 54
End: 2024-09-09

## 2024-09-26 ENCOUNTER — APPOINTMENT (OUTPATIENT)
Dept: INTERNAL MEDICINE | Facility: CLINIC | Age: 54
End: 2024-09-26

## 2024-09-26 ENCOUNTER — NON-APPOINTMENT (OUTPATIENT)
Age: 54
End: 2024-09-26

## 2024-09-26 VITALS
BODY MASS INDEX: 27.77 KG/M2 | OXYGEN SATURATION: 99 % | SYSTOLIC BLOOD PRESSURE: 118 MMHG | WEIGHT: 194 LBS | TEMPERATURE: 97.3 F | DIASTOLIC BLOOD PRESSURE: 66 MMHG | HEART RATE: 87 BPM | HEIGHT: 70.08 IN

## 2024-09-26 DIAGNOSIS — Z12.2 ENCOUNTER FOR SCREENING FOR MALIGNANT NEOPLASM OF RESPIRATORY ORGANS: ICD-10-CM

## 2024-09-26 DIAGNOSIS — Z13.6 ENCOUNTER FOR SCREENING FOR CARDIOVASCULAR DISORDERS: ICD-10-CM

## 2024-09-26 DIAGNOSIS — I25.10 ATHEROSCLEROTIC HEART DISEASE OF NATIVE CORONARY ARTERY W/OUT ANGINA PECTORIS: ICD-10-CM

## 2024-09-26 DIAGNOSIS — Z23 ENCOUNTER FOR IMMUNIZATION: ICD-10-CM

## 2024-09-26 DIAGNOSIS — I10 ESSENTIAL (PRIMARY) HYPERTENSION: ICD-10-CM

## 2024-09-26 DIAGNOSIS — Z13.31 ENCOUNTER FOR SCREENING FOR DEPRESSION: ICD-10-CM

## 2024-09-26 DIAGNOSIS — Z12.11 ENCOUNTER FOR SCREENING FOR MALIGNANT NEOPLASM OF COLON: ICD-10-CM

## 2024-09-26 DIAGNOSIS — Z00.00 ENCOUNTER FOR GENERAL ADULT MEDICAL EXAMINATION W/OUT ABNORMAL FINDINGS: ICD-10-CM

## 2024-09-26 DIAGNOSIS — Z13.228 ENCOUNTER FOR SCREENING FOR OTHER METABOLIC DISORDERS: ICD-10-CM

## 2024-09-26 PROCEDURE — 81003 URINALYSIS AUTO W/O SCOPE: CPT | Mod: QW

## 2024-09-26 PROCEDURE — 90656 IIV3 VACC NO PRSV 0.5 ML IM: CPT

## 2024-09-26 PROCEDURE — G0296 VISIT TO DETERM LDCT ELIG: CPT

## 2024-09-26 PROCEDURE — G0008: CPT

## 2024-09-26 PROCEDURE — 93000 ELECTROCARDIOGRAM COMPLETE: CPT

## 2024-09-26 PROCEDURE — 36415 COLL VENOUS BLD VENIPUNCTURE: CPT

## 2024-09-26 PROCEDURE — 99386 PREV VISIT NEW AGE 40-64: CPT | Mod: 25

## 2024-09-26 RX ORDER — CHLORHEXIDINE GLUCONATE 4 G/100ML
4 SOLUTION TOPICAL
Refills: 0 | Status: ACTIVE | COMMUNITY

## 2024-09-26 NOTE — HEALTH RISK ASSESSMENT
[Good] : ~his/her~ current health as good [Fair] :  ~his/her~ mood as fair [Yes] : Yes [Monthly or less (1 pt)] : Monthly or less (1 point) [1 or 2 (0 pts)] : 1 or 2 (0 points) [Never (0 pts)] : Never (0 points) [No] : In the past 12 months have you used drugs other than those required for medical reasons? No [No falls in past year] : Patient reported no falls in the past year [Little interest or pleasure doing things] : 1) Little interest or pleasure doing things [Feeling down, depressed, or hopeless] : 2) Feeling down, depressed, or hopeless [0] : 2) Feeling down, depressed, or hopeless: Not at all (0) [PHQ-2 Negative - No further assessment needed] : PHQ-2 Negative - No further assessment needed [HIV test declined] : HIV test declined [Hepatitis C test declined] : Hepatitis C test declined [None] : None [With Family] : lives with family [Employed] : employed [] :  [# Of Children ___] : has [unfilled] children [Sexually Active] : sexually active [Feels Safe at Home] : Feels safe at home [Fully functional (bathing, dressing, toileting, transferring, walking, feeding)] : Fully functional (bathing, dressing, toileting, transferring, walking, feeding) [Fully functional (using the telephone, shopping, preparing meals, housekeeping, doing laundry, using] : Fully functional and needs no help or supervision to perform IADLs (using the telephone, shopping, preparing meals, housekeeping, doing laundry, using transportation, managing medications and managing finances) [Reports normal functional visual acuity (ie: able to read med bottle)] : Reports normal functional visual acuity [Smoke Detector] : smoke detector [Carbon Monoxide Detector] : carbon monoxide detector [Safety elements used in home] : safety elements used in home [Seat Belt] :  uses seat belt [Current] : Current [0-4] : 0-4 [YES] : Yes [Have you attended a firearm safety workshop or class?] : A firearm safety workshop or class has been attended. 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 [de-identified] : pt drinks occasionally [Audit-CScore] : 1 [de-identified] : pt is active [de-identified] : good [[id number]] : 0 [Change in mental status noted] : No change in mental status noted [Language] : denies difficulty with language [Behavior] : denies difficulty with behavior [Learning/Retaining New Information] : denies difficulty learning/retaining new information [Handling Complex Tasks] : denies difficulty handling complex tasks [Reasoning] : denies difficulty with reasoning [Spatial Ability and Orientation] : denies difficulty with spatial ability and orientation [High Risk Behavior] : no high risk behavior [Reports changes in hearing] : Reports no changes in hearing [Reports changes in vision] : Reports no changes in vision [Reports changes in dental health] : Reports no changes in dental health [Sunscreen] : does not use sunscreen [Travel to Developing Areas] : does not  travel to developing areas [TB Exposure] : is not being exposed to tuberculosis [ColonoscopyComments] : pt never had one [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 [Are there any firearms stored in your household that are loaded?] : No firearms are stored in the household loaded. [Are there any children in your household?] : No children are in the household. [Has anyone in the household been feeling low/depressed/been struggling?] : No one in the household has been feeling low/depressed/been struggling.

## 2024-09-26 NOTE — HISTORY OF PRESENT ILLNESS
[de-identified] : 54 year old M patient presents for CPE and follow up of chronic medical conditions. He reports compliance with all prescribed medical therapy and dietary restrictions. He was told by his pulmonologist that he has coronary artery calcifications and would like to see a cardiologist. He has trigger finger on his right hand (s/p trigger release on the left hand) and has recurrent boils.

## 2024-09-26 NOTE — PHYSICAL EXAM
[No Acute Distress] : no acute distress [Well-Appearing] : well-appearing [Normal Sclera/Conjunctiva] : normal sclera/conjunctiva [Normal Outer Ear/Nose] : the outer ears and nose were normal in appearance [No Lymphadenopathy] : no lymphadenopathy [No Respiratory Distress] : no respiratory distress  [No Accessory Muscle Use] : no accessory muscle use [Clear to Auscultation] : lungs were clear to auscultation bilaterally [Normal Rate] : normal rate  [Regular Rhythm] : with a regular rhythm [Normal S1, S2] : normal S1 and S2 [No Edema] : there was no peripheral edema [de-identified] : slight left eyelid droop

## 2024-10-01 ENCOUNTER — APPOINTMENT (OUTPATIENT)
Dept: ORTHOPEDIC SURGERY | Facility: CLINIC | Age: 54
End: 2024-10-01
Payer: COMMERCIAL

## 2024-10-01 VITALS — BODY MASS INDEX: 27.77 KG/M2 | HEIGHT: 70 IN | WEIGHT: 194 LBS

## 2024-10-01 DIAGNOSIS — M65.341 TRIGGER FINGER, RIGHT RING FINGER: ICD-10-CM

## 2024-10-01 PROCEDURE — 20550 NJX 1 TENDON SHEATH/LIGAMENT: CPT | Mod: F8

## 2024-10-01 PROCEDURE — J3490M: CUSTOM

## 2024-10-01 PROCEDURE — 99203 OFFICE O/P NEW LOW 30 MIN: CPT | Mod: 25

## 2024-10-01 NOTE — HISTORY OF PRESENT ILLNESS
[de-identified] : R RF trigger for 4 months   [10] : 10 [4] : 4 [Dull/Aching] : dull/aching [] : no [FreeTextEntry1] : R hand [FreeTextEntry5] : R RF locking for 2 months using tape [de-identified] : activity

## 2024-10-01 NOTE — ASSESSMENT
[FreeTextEntry1] : R RF tendon sheath injection was performed because of pain and inflammation Anesthesia: ethyl chloride sprayed topically Celestone 6mg: An injection of Celestone 1cc Lidocaine: An injection of Lidocaine 1% 1cc Marcaine: An injection of Marcaine 0.5% 1cc   The risks, benefits, and alternatives to cortisone injection were explained in full to the patient. Risks outlined include but are not limited to infection, sepsis, bleeding, scarring, skin discoloration, temporary increase in pain, syncopal episode, failure to resolve symptoms, allergic reaction, symptom recurrence, and elevation of blood sugar in diabetics. Patient understood the risks. All questions were answered. After discussion of options, patient verbally consented to an injection. Sterile prep was done of the injection site. Patient tolerated the procedure well. Advised to ice the injection site this evening.  Activity modification as tolerated Ice as needed NSAIDs as needed Return prn

## 2024-10-01 NOTE — PHYSICAL EXAM
[de-identified] : R hand:  Mild swelling  Tender 4th A1 pulley  Decreased ring ROM  +ring triggering

## 2024-10-05 ENCOUNTER — NON-APPOINTMENT (OUTPATIENT)
Age: 54
End: 2024-10-05

## 2024-10-05 LAB
25(OH)D3 SERPL-MCNC: 31.6 NG/ML
ALBUMIN SERPL ELPH-MCNC: 4.7 G/DL
ALP BLD-CCNC: 86 U/L
ALT SERPL-CCNC: 18 U/L
ANION GAP SERPL CALC-SCNC: 14 MMOL/L
APPEARANCE: CLEAR
AST SERPL-CCNC: 21 U/L
BACTERIA: NEGATIVE /HPF
BILIRUB SERPL-MCNC: 0.4 MG/DL
BILIRUBIN URINE: NEGATIVE
BLOOD URINE: NEGATIVE
BUN SERPL-MCNC: 10 MG/DL
CALCIUM SERPL-MCNC: 10.3 MG/DL
CAST: 0 /LPF
CHLORIDE SERPL-SCNC: 104 MMOL/L
CHOLEST SERPL-MCNC: 135 MG/DL
CO2 SERPL-SCNC: 24 MMOL/L
COLOR: YELLOW
CREAT SERPL-MCNC: 0.67 MG/DL
EGFR: 110 ML/MIN/1.73M2
EPITHELIAL CELLS: 0 /HPF
ESTIMATED AVERAGE GLUCOSE: 146 MG/DL
GLUCOSE QUALITATIVE U: NEGATIVE MG/DL
GLUCOSE SERPL-MCNC: 119 MG/DL
HBA1C MFR BLD HPLC: 6.7 %
HCT VFR BLD CALC: 46.4 %
HCV AB SER QL: NONREACTIVE
HCV S/CO RATIO: 0.11 S/CO
HDLC SERPL-MCNC: 34 MG/DL
HGB BLD-MCNC: 15 G/DL
HIV1+2 AB SPEC QL IA.RAPID: NONREACTIVE
KETONES URINE: NEGATIVE MG/DL
LDLC SERPL CALC-MCNC: 82 MG/DL
LEUKOCYTE ESTERASE URINE: NEGATIVE
MCHC RBC-ENTMCNC: 29.7 PG
MCHC RBC-ENTMCNC: 32.3 GM/DL
MCV RBC AUTO: 91.9 FL
MICROSCOPIC-UA: NORMAL
NITRITE URINE: NEGATIVE
NONHDLC SERPL-MCNC: 101 MG/DL
PH URINE: 5.5
PLATELET # BLD AUTO: 242 K/UL
POTASSIUM SERPL-SCNC: 5.3 MMOL/L
PROT SERPL-MCNC: 7.4 G/DL
PROTEIN URINE: NEGATIVE MG/DL
PSA SERPL-MCNC: 1.67 NG/ML
RBC # BLD: 5.05 M/UL
RBC # FLD: 12.8 %
RED BLOOD CELLS URINE: 2 /HPF
SODIUM SERPL-SCNC: 143 MMOL/L
SPECIFIC GRAVITY URINE: 1.02
TRIGL SERPL-MCNC: 102 MG/DL
TSH SERPL-ACNC: 0.82 UIU/ML
UROBILINOGEN URINE: 0.2 MG/DL
VIT B12 SERPL-MCNC: 756 PG/ML
WBC # FLD AUTO: 6.24 K/UL
WHITE BLOOD CELLS URINE: 0 /HPF

## 2024-10-09 ENCOUNTER — EMERGENCY (EMERGENCY)
Facility: HOSPITAL | Age: 54
LOS: 1 days | Discharge: HOME | End: 2024-10-09
Attending: STUDENT IN AN ORGANIZED HEALTH CARE EDUCATION/TRAINING PROGRAM
Payer: COMMERCIAL

## 2024-10-09 VITALS
RESPIRATION RATE: 18 BRPM | SYSTOLIC BLOOD PRESSURE: 122 MMHG | TEMPERATURE: 99 F | OXYGEN SATURATION: 99 % | HEART RATE: 111 BPM | DIASTOLIC BLOOD PRESSURE: 79 MMHG

## 2024-10-09 DIAGNOSIS — Z98.890 OTHER SPECIFIED POSTPROCEDURAL STATES: Chronic | ICD-10-CM

## 2024-10-09 LAB
ALBUMIN SERPL ELPH-MCNC: 4.5 G/DL — SIGNIFICANT CHANGE UP (ref 3.3–5)
ALP SERPL-CCNC: 87 U/L — SIGNIFICANT CHANGE UP (ref 40–120)
ALT FLD-CCNC: 14 U/L — SIGNIFICANT CHANGE UP (ref 10–45)
ANION GAP SERPL CALC-SCNC: 11 MMOL/L — SIGNIFICANT CHANGE UP (ref 5–17)
APPEARANCE UR: CLEAR — SIGNIFICANT CHANGE UP
AST SERPL-CCNC: 14 U/L — SIGNIFICANT CHANGE UP (ref 10–40)
BACTERIA # UR AUTO: NEGATIVE /HPF — SIGNIFICANT CHANGE UP
BASOPHILS # BLD AUTO: 0.03 K/UL — SIGNIFICANT CHANGE UP (ref 0–0.2)
BASOPHILS NFR BLD AUTO: 0.3 % — SIGNIFICANT CHANGE UP (ref 0–2)
BILIRUB SERPL-MCNC: 0.9 MG/DL — SIGNIFICANT CHANGE UP (ref 0.2–1.2)
BILIRUB UR-MCNC: NEGATIVE — SIGNIFICANT CHANGE UP
BUN SERPL-MCNC: 15 MG/DL — SIGNIFICANT CHANGE UP (ref 7–23)
CALCIUM SERPL-MCNC: 9.9 MG/DL — SIGNIFICANT CHANGE UP (ref 8.4–10.5)
CAST: 0 /LPF — SIGNIFICANT CHANGE UP (ref 0–4)
CHLORIDE SERPL-SCNC: 101 MMOL/L — SIGNIFICANT CHANGE UP (ref 96–108)
CO2 SERPL-SCNC: 25 MMOL/L — SIGNIFICANT CHANGE UP (ref 22–31)
COLOR SPEC: SIGNIFICANT CHANGE UP
CREAT SERPL-MCNC: 0.77 MG/DL — SIGNIFICANT CHANGE UP (ref 0.5–1.3)
DIFF PNL FLD: NEGATIVE — SIGNIFICANT CHANGE UP
EGFR: 106 ML/MIN/1.73M2 — SIGNIFICANT CHANGE UP
EOSINOPHIL # BLD AUTO: 0.12 K/UL — SIGNIFICANT CHANGE UP (ref 0–0.5)
EOSINOPHIL NFR BLD AUTO: 1 % — SIGNIFICANT CHANGE UP (ref 0–6)
GLUCOSE BLDC GLUCOMTR-MCNC: 136 MG/DL — HIGH (ref 70–99)
GLUCOSE BLDC GLUCOMTR-MCNC: 151 MG/DL — HIGH (ref 70–99)
GLUCOSE SERPL-MCNC: 143 MG/DL — HIGH (ref 70–99)
GLUCOSE UR QL: NEGATIVE MG/DL — SIGNIFICANT CHANGE UP
HCT VFR BLD CALC: 41.2 % — SIGNIFICANT CHANGE UP (ref 39–50)
HGB BLD-MCNC: 13.7 G/DL — SIGNIFICANT CHANGE UP (ref 13–17)
IMM GRANULOCYTES NFR BLD AUTO: 0.3 % — SIGNIFICANT CHANGE UP (ref 0–0.9)
KETONES UR-MCNC: 15 MG/DL
LEUKOCYTE ESTERASE UR-ACNC: NEGATIVE — SIGNIFICANT CHANGE UP
LYMPHOCYTES # BLD AUTO: 18.1 % — SIGNIFICANT CHANGE UP (ref 13–44)
LYMPHOCYTES # BLD AUTO: 2.07 K/UL — SIGNIFICANT CHANGE UP (ref 1–3.3)
MCHC RBC-ENTMCNC: 29.8 PG — SIGNIFICANT CHANGE UP (ref 27–34)
MCHC RBC-ENTMCNC: 33.3 GM/DL — SIGNIFICANT CHANGE UP (ref 32–36)
MCV RBC AUTO: 89.8 FL — SIGNIFICANT CHANGE UP (ref 80–100)
MONOCYTES # BLD AUTO: 0.87 K/UL — SIGNIFICANT CHANGE UP (ref 0–0.9)
MONOCYTES NFR BLD AUTO: 7.6 % — SIGNIFICANT CHANGE UP (ref 2–14)
NEUTROPHILS # BLD AUTO: 8.32 K/UL — HIGH (ref 1.8–7.4)
NEUTROPHILS NFR BLD AUTO: 72.7 % — SIGNIFICANT CHANGE UP (ref 43–77)
NITRITE UR-MCNC: NEGATIVE — SIGNIFICANT CHANGE UP
NRBC # BLD: 0 /100 WBCS — SIGNIFICANT CHANGE UP (ref 0–0)
PH UR: 6.5 — SIGNIFICANT CHANGE UP (ref 5–8)
PLATELET # BLD AUTO: 230 K/UL — SIGNIFICANT CHANGE UP (ref 150–400)
POTASSIUM SERPL-MCNC: 4.2 MMOL/L — SIGNIFICANT CHANGE UP (ref 3.5–5.3)
POTASSIUM SERPL-SCNC: 4.2 MMOL/L — SIGNIFICANT CHANGE UP (ref 3.5–5.3)
PROT SERPL-MCNC: 7.5 G/DL — SIGNIFICANT CHANGE UP (ref 6–8.3)
PROT UR-MCNC: SIGNIFICANT CHANGE UP MG/DL
RBC # BLD: 4.59 M/UL — SIGNIFICANT CHANGE UP (ref 4.2–5.8)
RBC # FLD: 12.6 % — SIGNIFICANT CHANGE UP (ref 10.3–14.5)
RBC CASTS # UR COMP ASSIST: 4 /HPF — SIGNIFICANT CHANGE UP (ref 0–4)
SODIUM SERPL-SCNC: 137 MMOL/L — SIGNIFICANT CHANGE UP (ref 135–145)
SP GR SPEC: 1.03 — SIGNIFICANT CHANGE UP (ref 1–1.03)
SQUAMOUS # UR AUTO: 0 /HPF — SIGNIFICANT CHANGE UP (ref 0–5)
UROBILINOGEN FLD QL: 1 MG/DL — SIGNIFICANT CHANGE UP (ref 0.2–1)
WBC # BLD: 11.45 K/UL — HIGH (ref 3.8–10.5)
WBC # FLD AUTO: 11.45 K/UL — HIGH (ref 3.8–10.5)
WBC UR QL: 1 /HPF — SIGNIFICANT CHANGE UP (ref 0–5)

## 2024-10-09 PROCEDURE — 74177 CT ABD & PELVIS W/CONTRAST: CPT | Mod: 26,MC

## 2024-10-09 PROCEDURE — 36410 VNPNXR 3YR/> PHY/QHP DX/THER: CPT

## 2024-10-09 PROCEDURE — 99053 MED SERV 10PM-8AM 24 HR FAC: CPT

## 2024-10-09 PROCEDURE — 99223 1ST HOSP IP/OBS HIGH 75: CPT

## 2024-10-09 RX ORDER — ALCOHOL ANTISEPTIC PADS
12.5 PADS, MEDICATED (EA) TOPICAL ONCE
Refills: 0 | Status: ACTIVE | OUTPATIENT
Start: 2024-10-09

## 2024-10-09 RX ORDER — KETOROLAC TROMETHAMINE 10 MG/1
15 TABLET, FILM COATED ORAL ONCE
Refills: 0 | Status: DISCONTINUED | OUTPATIENT
Start: 2024-10-09 | End: 2024-10-09

## 2024-10-09 RX ORDER — OXYCODONE HYDROCHLORIDE 30 MG/1
5 TABLET, FILM COATED, EXTENDED RELEASE ORAL ONCE
Refills: 0 | Status: DISCONTINUED | OUTPATIENT
Start: 2024-10-09 | End: 2024-10-09

## 2024-10-09 RX ORDER — SODIUM CHLORIDE 0.9 % (FLUSH) 0.9 %
1000 SYRINGE (ML) INJECTION ONCE
Refills: 0 | Status: COMPLETED | OUTPATIENT
Start: 2024-10-09 | End: 2024-10-09

## 2024-10-09 RX ORDER — KETOROLAC TROMETHAMINE 10 MG/1
15 TABLET, FILM COATED ORAL ONCE
Refills: 0 | Status: DISCONTINUED | OUTPATIENT
Start: 2024-10-09 | End: 2024-10-10

## 2024-10-09 RX ORDER — ALCOHOL ANTISEPTIC PADS
15 PADS, MEDICATED (EA) TOPICAL ONCE
Refills: 0 | Status: ACTIVE | OUTPATIENT
Start: 2024-10-09 | End: 2025-09-07

## 2024-10-09 RX ORDER — ALCOHOL ANTISEPTIC PADS
25 PADS, MEDICATED (EA) TOPICAL ONCE
Refills: 0 | Status: ACTIVE | OUTPATIENT
Start: 2024-10-09

## 2024-10-09 RX ORDER — ATORVASTATIN CALCIUM 10 MG/1
40 TABLET, FILM COATED ORAL AT BEDTIME
Refills: 0 | Status: ACTIVE | OUTPATIENT
Start: 2024-10-09 | End: 2025-09-07

## 2024-10-09 RX ORDER — INSULIN LISPRO 100/ML
VIAL (ML) SUBCUTANEOUS AT BEDTIME
Refills: 0 | Status: ACTIVE | OUTPATIENT
Start: 2024-10-09 | End: 2025-09-07

## 2024-10-09 RX ORDER — SODIUM CHLORIDE IRRIG SOLUTION 0.9 %
1000 SOLUTION, IRRIGATION IRRIGATION
Refills: 0 | Status: ACTIVE | OUTPATIENT
Start: 2024-10-09 | End: 2025-09-07

## 2024-10-09 RX ORDER — DOXYCYCLINE HYCLATE 100 MG
100 CAPSULE ORAL EVERY 12 HOURS
Refills: 0 | Status: ACTIVE | OUTPATIENT
Start: 2024-10-09 | End: 2025-09-07

## 2024-10-09 RX ORDER — ACETAMINOPHEN 325 MG
1000 TABLET ORAL ONCE
Refills: 0 | Status: COMPLETED | OUTPATIENT
Start: 2024-10-09 | End: 2024-10-09

## 2024-10-09 RX ORDER — CEFAZOLIN SODIUM 1 G
1000 VIAL (EA) INJECTION ONCE
Refills: 0 | Status: COMPLETED | OUTPATIENT
Start: 2024-10-09 | End: 2024-10-09

## 2024-10-09 RX ORDER — INSULIN LISPRO 100/ML
VIAL (ML) SUBCUTANEOUS
Refills: 0 | Status: ACTIVE | OUTPATIENT
Start: 2024-10-09 | End: 2025-09-07

## 2024-10-09 RX ORDER — DOXYCYCLINE HYCLATE 100 MG
100 CAPSULE ORAL ONCE
Refills: 0 | Status: COMPLETED | OUTPATIENT
Start: 2024-10-09 | End: 2024-10-09

## 2024-10-09 RX ORDER — CEFAZOLIN SODIUM 1 G
1000 VIAL (EA) INJECTION EVERY 8 HOURS
Refills: 0 | Status: ACTIVE | OUTPATIENT
Start: 2024-10-09 | End: 2025-09-07

## 2024-10-09 RX ORDER — METFORMIN HCL 500 MG
1000 TABLET ORAL
Refills: 0 | Status: DISCONTINUED | OUTPATIENT
Start: 2024-10-09 | End: 2024-10-09

## 2024-10-09 RX ORDER — GLUCAGON INJECTION, SOLUTION 0.5 MG/.1ML
1 INJECTION, SOLUTION SUBCUTANEOUS ONCE
Refills: 0 | Status: ACTIVE | OUTPATIENT
Start: 2024-10-09 | End: 2025-09-07

## 2024-10-09 RX ADMIN — KETOROLAC TROMETHAMINE 15 MILLIGRAM(S): 10 TABLET, FILM COATED ORAL at 06:30

## 2024-10-09 RX ADMIN — Medication 400 MILLIGRAM(S): at 06:30

## 2024-10-09 RX ADMIN — Medication 400 MILLIGRAM(S): at 20:47

## 2024-10-09 RX ADMIN — Medication 1000 MILLILITER(S): at 06:31

## 2024-10-09 RX ADMIN — KETOROLAC TROMETHAMINE 15 MILLIGRAM(S): 10 TABLET, FILM COATED ORAL at 08:41

## 2024-10-09 RX ADMIN — Medication 1000 MILLIGRAM(S): at 08:40

## 2024-10-09 RX ADMIN — Medication 100 MILLIGRAM(S): at 20:46

## 2024-10-09 RX ADMIN — OXYCODONE HYDROCHLORIDE 5 MILLIGRAM(S): 30 TABLET, FILM COATED, EXTENDED RELEASE ORAL at 20:46

## 2024-10-09 RX ADMIN — ATORVASTATIN CALCIUM 40 MILLIGRAM(S): 10 TABLET, FILM COATED ORAL at 20:46

## 2024-10-09 RX ADMIN — OXYCODONE HYDROCHLORIDE 5 MILLIGRAM(S): 30 TABLET, FILM COATED, EXTENDED RELEASE ORAL at 13:43

## 2024-10-09 RX ADMIN — Medication 100 MILLIGRAM(S): at 12:01

## 2024-10-09 RX ADMIN — Medication 100 MILLIGRAM(S): at 12:57

## 2024-10-09 RX ADMIN — OXYCODONE HYDROCHLORIDE 5 MILLIGRAM(S): 30 TABLET, FILM COATED, EXTENDED RELEASE ORAL at 21:45

## 2024-10-09 RX ADMIN — Medication 1000 MILLIGRAM(S): at 21:45

## 2024-10-09 NOTE — ED CDU PROVIDER INITIAL DAY NOTE - ATTENDING CONTRIBUTION TO CARE
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  No pain with bowel movements.  Patient had similar lesions in the past requiring antibiotics but never able to be drained.  Physical exam showing induration at the left gluteal cyst,  surrounding erythema, no fluctuance noted.  Regular rate and rhythm, well-appearing, nontoxic. Given hx and physical, ddx includes but is not limited to Abscess, fistula, cellulitis, hidradenitis.  Plan for CT to evaluate for deeper infection, labs, pain control, reassess.  Dispo pending CT results possible plan for antibiotics and discharge with Derm follow-up.

## 2024-10-09 NOTE — ED CDU PROVIDER INITIAL DAY NOTE - PHYSICAL EXAMINATION
General: NAD. Nontoxic, well appearing. Speaking in full sentences with appropriate phonation.  Neck: Supple  Cardiac: RRR  Pulmonary: CTAB  Abdominal: Soft, nontender, nondistended, no peritoneal signs.  Neurologic: No gross focal sensory or motor deficits. Moves all 4 extremities during encounter.  Musculoskeletal: Strength appropriate in all 4 extremities for age with no limited ROM. No visible deformities or extremity swelling.  Skin: Color appropriate for race. Intact, warm, and well-perfused. 4x3 inch fluctuant and indurated lesion to L perineal region/infragluteal fold, +approx 1x1cm.  Psychiatric: Mood and affect congruent

## 2024-10-09 NOTE — ED PROVIDER NOTE - PHYSICAL EXAMINATION
General: NAD. Nontoxic, well appearing. Speaking in full sentences with appropriate phonation.  Neck: Supple  Cardiac: RRR  Pulmonary: CTAB  Abdominal: Soft, nontender, nondistended, no peritoneal signs.  Neurologic: No gross focal sensory or motor deficits. Moves all 4 extremities during encounter.  Musculoskeletal: Strength appropriate in all 4 extremities for age with no limited ROM. No visible deformities or extremity swelling.  Skin: Color appropriate for race. Intact, warm, and well-perfused. 4x3 inch fluctuant and indurated lesion to L perineal region/infragluteal fold, +TTP. given size and proximity to anal and perineal region. Multiple punctate chronic appearing lesions that are mostly healed to the suprapubic region, approx 1x1cm.  Psychiatric: Mood and affect congruent

## 2024-10-09 NOTE — ED CDU PROVIDER INITIAL DAY NOTE - OBJECTIVE STATEMENT
54yom pmhx of HTN HLD DM on metformin, recurrent skin infections/abscesses presents to the ED for evaluation of left cranial/infra gluteal abscess X 3 days.  States he has had multiple large abscesses in this area that have required drainage and antibiotics.  Follows dermatology since lesions became recurrent in 2021.  States he has been using chlorhexidine, topical mupirocin and topical clindamycin to the area once he noticed the skin become firm.  He also notes that he has similar lesions in his left axilla and chronic partially healed lesions in his groin and suprapubic region.  Has never seen general surgery for this issue.  His main complaint is pain while sitting.  He denies any fevers, diarrhea, constipation, pain with BM, blood/mucus in stool.  Denies any urinary, penile and testicle symptoms.  He is otherwise in his normal state of health and further denies any chest pain, shortness of breath, NVD,

## 2024-10-09 NOTE — ED CDU PROVIDER DISPOSITION NOTE - CLINICAL COURSE
54yom pmhx of HTN HLD DM on metformin, recurrent skin infections/abscesses presents to the ED for evaluation of left cranial/infra gluteal abscess X 3 days.  States he has had multiple large abscesses in this area that have required drainage and antibiotics.  Follows dermatology since lesions became recurrent in 2021.  States he has been using chlorhexidine, topical mupirocin and topical clindamycin to the area once he noticed the skin become firm.  He also notes that he has similar lesions in his left axilla and chronic partially healed lesions in his groin and suprapubic region.  Has never seen general surgery for this issue.  His main complaint is pain while sitting.  He denies any fevers, diarrhea, constipation, pain with BM, blood/mucus in stool.  Denies any urinary, penile and testicle symptoms.  He is otherwise in his normal state of health and further denies any chest pain, shortness of breath, NVD,  ED course: Afebrile, WBC 11.45, CT showed "Diffuse fat stranding of the left perineum. No subcutaneous emphysema or drainable collection. Findings are compatible with cellulitis." Started on Ancef and Doxycycline. Plan to continue antibiotics and pain control in CDU.

## 2024-10-09 NOTE — ED PROVIDER NOTE - ATTENDING CONTRIBUTION TO CARE
I have personally performed a face to face medical and diagnostic evaluation of the patient. I have discussed with and reviewed the Resident's and/or ACP's and/or Medical/PA/NP student's note and agree with the History, ROS, Physical Exam and MDM unless otherwise indicated. A brief summary of my personal evaluation and impression can be found below.     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  No pain with bowel movements.  Patient had similar lesions in the past requiring antibiotics but never able to be drained.  Physical exam showing induration at the left gluteal cyst,  surrounding erythema, no fluctuance noted.  Regular rate and rhythm, well-appearing, nontoxic. Given hx and physical, ddx includes but is not limited to Abscess, fistula, cellulitis, hidradenitis.  Plan for CT to evaluate for deeper infection, labs, pain control, reassess.  Dispo pending CT results possible plan for antibiotics and discharge with Derm follow-up.

## 2024-10-09 NOTE — ED CDU PROVIDER DISPOSITION NOTE - PATIENT PORTAL LINK FT
You can access the FollowMyHealth Patient Portal offered by [facility] by registering at the following website: [email or web address]. By joining [facility]’s FollowMyHealth portal, you will also be able to view your health information using other applications (apps) compatible with our system.

## 2024-10-09 NOTE — ED CDU PROVIDER DISPOSITION NOTE - NS_OBSORDERDATE_ED_A_ED
09-Oct-2024 11:54 Patient to discharge inpatient rehab vs skilled nursing facility.  Peer to peer complete and inpatient rehab denied.  Family completed fast appeal.  NEFTALI spoke to Elizabeth with [employer] [phone] regarding status of appeal.  Per Elizabeth, appeal still pending.  Per [facility] has 72 hours to review patient information with a date of 02/15/2020 for review.  NEFTALI updated patient regarding status of discharge from facility.       02/14/20 1100   Discharge Assessment   Assessment Type Discharge Planning Reassessment      4:00pm spoke to Sara with [employer], rehab appeal still pending.  She has the request for SNF.  Will follow up Monday.MERI Prince

## 2024-10-09 NOTE — ED PROVIDER NOTE - CLINICAL SUMMARY MEDICAL DECISION MAKING FREE TEXT BOX
EM PGY-2/Tono DO: 54-year-old male PMHx DM2 on metformin, recurrent skin infections/abscesses presents to the ED for evaluation of left cranial/infra gluteal abscess X 3 days.  States he has had multiple large abscesses in this area that have required drainage and antibiotics.  Follows dermatology since lesions became recurrent in 2021.  States he has been using chlorhexidine, topical mupirocin and topical clindamycin to the area once he noticed the skin become firm.  He also notes that he has similar lesions in his left axilla and chronic partially healed lesions in his groin and suprapubic region.  Has never seen general surgery for this issue.  His main complaint is pain while sitting.  He denies any fevers, diarrhea, constipation, pain with BM, blood/mucus in stool.  Denies any urinary, penile and testicle symptoms.  He is otherwise in his normal state of health and further denies any chest pain, shortness of breath, NVD, abdominal pain, extremity swelling or any other acute symptoms at this time. Has f/u with Derm on 11/6/24.  Upon arrival VSS, unspecified tachycardia at 111.  On exam patient is very well-appearing, friendly, speaking in full sentences in NAD.  CV RRR, lungs CTAB.  Abdomen is soft, nontender with no peritoneal signs.  Multiple punctate chronic appearing lesions that are mostly healed to the suprapubic region, approx 1x1cm.   exam done in presence of RN as chaperone. 4x3 inch fluctuant and indurated lesion to L perineal region/infragluteal fold, +TTP. given size and proximity to anal and perineal region, c/f fistula, eval with CTAP. +/- surgery cs, will require outpatient follow up w/ general surgery regardless as appears to be consistent with hidradenitis suppurativa. Orders reflect ddx. Reeval to dispo.

## 2024-10-09 NOTE — ED ADULT NURSE NOTE - OBJECTIVE STATEMENT
54y M  C/o abscess. Pt states he has been having reoccurring abscesses. Over the past 3 days he developed an abscess in the perirectal area. Pt states that it has happened a few months ago in the same location and it was attempted to be drained however was told it was solid and referred to a dermatologist.  Tonight while working pt states the pain  worsened and was very uncomfortable. Additionally pt states he had diarrhea earlier today. Upon assessment pt well appearing however has a 2-3cm abscess on the lower buttock near rectum. Pt tachycardic to 103bpm. Denies any fever, cough, N/V/Cp/SOb/Gi symptoms. PMH of DM. PSH of left ACL repair.

## 2024-10-09 NOTE — ED ADULT NURSE REASSESSMENT NOTE - NSFALLUNIVINTERV_ED_ALL_ED
Bed/Stretcher in lowest position, wheels locked, appropriate side rails in place/Call bell, personal items and telephone in reach/Instruct patient to call for assistance before getting out of bed/chair/stretcher/Non-slip footwear applied when patient is off stretcher/[place] to call system/Physically safe environment - no spills, clutter or unnecessary equipment/Purposeful proactive rounding/Room/bathroom lighting operational, light cord in reach

## 2024-10-09 NOTE — ED CDU PROVIDER DISPOSITION NOTE - NSFOLLOWUPINSTRUCTIONS_ED_ALL_ED_FT
Hydrate.     Please take Tylenol 650mg and/or Motrin 600mg every 6 hours as needed for pain. For breakthrough/severe pain you can take Oxycodone 5mg every 6 hours. THIS MEDICATION CAN MAKE YOU DROWSY, PLEASE DO NOT DRIVE ON THIS MEDICATION.    We recommend you follow up with your primary care provider within the next 2-3 days, please bring all of your results with you. You can also follow up with your dermatologist in the next 2-3 days.     Please return to the Emergency Department with new, worsening, or concerning symptoms, such as:  -Worsening/severe pain  -Shortness of breath or trouble breathing  -Pressure, pain, tightness in chest  -Facial drooping, arm weakness, or speech difficulty     *More detailed information regarding your visit and discharge can be found by reviewing this packet You were seen in the emergency department for an infection. You were given antibiotics and pain medications.    Please take your antibiotics as prescribed:  - Cefuroxime 500 2x/day for 7 days  - Doxycycline 100 2x/day for 7 days    For your pain:  - Please take Tylenol 500-1000mg and/or Motrin 600mg every 6 hours as needed for pain  - For breakthrough/severe pain you can take Oxycodone 5-10mg every 6 hours. THIS MEDICATION CAN MAKE YOU DROWSY, PLEASE DO NOT DRIVE ON THIS MEDICATION.    We recommend you follow up with your primary care provider within the next 2-3 days, please bring all of your results with you. You can also follow up with your dermatologist in the next 2-3 days.     Please return to the Emergency Department with new, worsening, or concerning symptoms, such as:  -Worsening infection or pain  -Worsening/severe pain  -Shortness of breath or trouble breathing  -Pressure, pain, tightness in chest  -Facial drooping, arm weakness, or speech difficulty

## 2024-10-09 NOTE — ED CDU PROVIDER DISPOSITION NOTE - ATTENDING CONTRIBUTION TO CARE
Attending Fabian: I performed a history and physical exam of the patient and discussed their management with the resident/fellow/student. I have reviewed the resident/fellow/student note and agree with the documented findings and plan of care, except as noted. I have personally performed a substantive portion of the visit including all aspects of the medical decision making. My medical decision making and observations are found above. Please refer to any progress notes for updates on clinical course. My notes supersedes the above resident/fellow/student note in case of discrepancy    55 y/o M w/ PMH of DM, hydradenitis suppurativa, HLD, HTN originally presented to the ED w/ L gluteal abscess. Pt has recurrent skin infections. Had developed pain and drainage from area of the last 3 days. Tried using mupirocin and topical clindamycin when he first developed symptoms which has helped in the past, but did not help. In main ED had labs and imaging. Labs w/ mild leukocytosis and CT w/ cellulitis but no abscess. Pt non toxic on exam, overall no acute distress. Lungs clear. Hr regular. abd nondistended/soft/nontender. L gluteal cleft w/ erythematous area and w/ active purulent drainage. Pt placed in CDU for IV abx, evals, vital monitoring, repeat labs. >>> While in CDU had rpt labs w/ improvement of WBC. Pt overall feeling improve. Will send out on oral abx. Pt comfortable w/ plan. Pt stable for DC w/ outpatient follow up.

## 2024-10-09 NOTE — ED CDU PROVIDER DISPOSITION NOTE - ATTENDING APP SHARED VISIT CONTRIBUTION OF CARE
Attending Fabian: I performed a history and physical exam of the patient and discussed their management with the JOSE/Resident. I have reviewed the JOSE/Resident note and agree with the documented findings and plan of care, except as noted. This was a shared visit with an JOSE/Resident. I reviewed and verified the documentation and independently performed my own history/exam/medical decision making. My medical decision making and observations are found above. Please refer to any progress notes for updates on clinical course. My notes supersedes the above JOSE/Resident note in case of discrepancy     55 y/o M w/ PMH of DM, hydradenitis suppurativa, HLD, HTN originally presented to the ED w/ L gluteal abscess. Pt has recurrent skin infections. Had developed pain and drainage from area of the last 3 days. Tried using mupirocin and topical clindamycin when he first developed symptoms which has helped in the past, but did not help. In main ED had labs and imaging. Labs w/ mild leukocytosis and CT w/ cellulitis but no abscess. Pt non toxic on exam, overall no acute distress. Lungs clear. Hr regular. abd nondistended/soft/nontender. L gluteal cleft w/ erythematous area and w/ active purulent drainage. Pt placed in CDU for IV abx, evals, vital monitoring, repeat labs. >>> While in CDU had rpt labs w/ improvement of WBC. Pt overall feeling improve. Will send out on oral abx. Pt comfortable w/ plan. Pt stable for DC w/ outpatient follow up.

## 2024-10-09 NOTE — ED ADULT NURSE NOTE - ED STAT RN HANDOFF DETAILS
Report given to ADAM Granados covering for CDU ADAM Sam, understands dispo, no questions at time of report

## 2024-10-09 NOTE — ED ADULT NURSE NOTE - NSFALLUNIVINTERV_ED_ALL_ED
Bed/Stretcher in lowest position, wheels locked, appropriate side rails in place/Call bell, personal items and telephone in reach/Instruct patient to call for assistance before getting out of bed/chair/stretcher/Non-slip footwear applied when patient is off stretcher/[place] to call system/Physically safe environment - no spills, clutter or unnecessary equipment/Purposeful proactive rounding/Room/bathroom lighting operational, light cord in reach

## 2024-10-10 VITALS
DIASTOLIC BLOOD PRESSURE: 72 MMHG | HEART RATE: 92 BPM | RESPIRATION RATE: 17 BRPM | TEMPERATURE: 99 F | SYSTOLIC BLOOD PRESSURE: 122 MMHG | OXYGEN SATURATION: 98 %

## 2024-10-10 LAB
A1C WITH ESTIMATED AVERAGE GLUCOSE RESULT: 6.7 % — HIGH (ref 4–5.6)
ALBUMIN SERPL ELPH-MCNC: 4 G/DL — SIGNIFICANT CHANGE UP (ref 3.3–5)
ALP SERPL-CCNC: 77 U/L — SIGNIFICANT CHANGE UP (ref 40–120)
ALT FLD-CCNC: 11 U/L — SIGNIFICANT CHANGE UP (ref 10–45)
ANION GAP SERPL CALC-SCNC: 11 MMOL/L — SIGNIFICANT CHANGE UP (ref 5–17)
AST SERPL-CCNC: 12 U/L — SIGNIFICANT CHANGE UP (ref 10–40)
BASOPHILS # BLD AUTO: 0.03 K/UL — SIGNIFICANT CHANGE UP (ref 0–0.2)
BASOPHILS NFR BLD AUTO: 0.3 % — SIGNIFICANT CHANGE UP (ref 0–2)
BILIRUB SERPL-MCNC: 0.6 MG/DL — SIGNIFICANT CHANGE UP (ref 0.2–1.2)
BUN SERPL-MCNC: 12 MG/DL — SIGNIFICANT CHANGE UP (ref 7–23)
CALCIUM SERPL-MCNC: 9.4 MG/DL — SIGNIFICANT CHANGE UP (ref 8.4–10.5)
CHLORIDE SERPL-SCNC: 102 MMOL/L — SIGNIFICANT CHANGE UP (ref 96–108)
CO2 SERPL-SCNC: 23 MMOL/L — SIGNIFICANT CHANGE UP (ref 22–31)
CREAT SERPL-MCNC: 0.65 MG/DL — SIGNIFICANT CHANGE UP (ref 0.5–1.3)
EGFR: 111 ML/MIN/1.73M2 — SIGNIFICANT CHANGE UP
EOSINOPHIL # BLD AUTO: 0.21 K/UL — SIGNIFICANT CHANGE UP (ref 0–0.5)
EOSINOPHIL NFR BLD AUTO: 2.3 % — SIGNIFICANT CHANGE UP (ref 0–6)
ESTIMATED AVERAGE GLUCOSE: 146 MG/DL — HIGH (ref 68–114)
GLUCOSE BLDC GLUCOMTR-MCNC: 126 MG/DL — HIGH (ref 70–99)
GLUCOSE BLDC GLUCOMTR-MCNC: 170 MG/DL — HIGH (ref 70–99)
GLUCOSE SERPL-MCNC: 150 MG/DL — HIGH (ref 70–99)
HCT VFR BLD CALC: 39.9 % — SIGNIFICANT CHANGE UP (ref 39–50)
HGB BLD-MCNC: 13.3 G/DL — SIGNIFICANT CHANGE UP (ref 13–17)
IMM GRANULOCYTES NFR BLD AUTO: 0.3 % — SIGNIFICANT CHANGE UP (ref 0–0.9)
LYMPHOCYTES # BLD AUTO: 1.57 K/UL — SIGNIFICANT CHANGE UP (ref 1–3.3)
LYMPHOCYTES # BLD AUTO: 17.2 % — SIGNIFICANT CHANGE UP (ref 13–44)
MCHC RBC-ENTMCNC: 30 PG — SIGNIFICANT CHANGE UP (ref 27–34)
MCHC RBC-ENTMCNC: 33.3 GM/DL — SIGNIFICANT CHANGE UP (ref 32–36)
MCV RBC AUTO: 89.9 FL — SIGNIFICANT CHANGE UP (ref 80–100)
MONOCYTES # BLD AUTO: 0.63 K/UL — SIGNIFICANT CHANGE UP (ref 0–0.9)
MONOCYTES NFR BLD AUTO: 6.9 % — SIGNIFICANT CHANGE UP (ref 2–14)
NEUTROPHILS # BLD AUTO: 6.65 K/UL — SIGNIFICANT CHANGE UP (ref 1.8–7.4)
NEUTROPHILS NFR BLD AUTO: 73 % — SIGNIFICANT CHANGE UP (ref 43–77)
NRBC # BLD: 0 /100 WBCS — SIGNIFICANT CHANGE UP (ref 0–0)
PLATELET # BLD AUTO: 207 K/UL — SIGNIFICANT CHANGE UP (ref 150–400)
POTASSIUM SERPL-MCNC: 3.9 MMOL/L — SIGNIFICANT CHANGE UP (ref 3.5–5.3)
POTASSIUM SERPL-SCNC: 3.9 MMOL/L — SIGNIFICANT CHANGE UP (ref 3.5–5.3)
PROT SERPL-MCNC: 6.9 G/DL — SIGNIFICANT CHANGE UP (ref 6–8.3)
RBC # BLD: 4.44 M/UL — SIGNIFICANT CHANGE UP (ref 4.2–5.8)
RBC # FLD: 12.6 % — SIGNIFICANT CHANGE UP (ref 10.3–14.5)
SODIUM SERPL-SCNC: 136 MMOL/L — SIGNIFICANT CHANGE UP (ref 135–145)
WBC # BLD: 9.12 K/UL — SIGNIFICANT CHANGE UP (ref 3.8–10.5)
WBC # FLD AUTO: 9.12 K/UL — SIGNIFICANT CHANGE UP (ref 3.8–10.5)

## 2024-10-10 PROCEDURE — 93005 ELECTROCARDIOGRAM TRACING: CPT

## 2024-10-10 PROCEDURE — 99285 EMERGENCY DEPT VISIT HI MDM: CPT | Mod: 25

## 2024-10-10 PROCEDURE — 96374 THER/PROPH/DIAG INJ IV PUSH: CPT | Mod: XU

## 2024-10-10 PROCEDURE — 96375 TX/PRO/DX INJ NEW DRUG ADDON: CPT

## 2024-10-10 PROCEDURE — 80053 COMPREHEN METABOLIC PANEL: CPT

## 2024-10-10 PROCEDURE — 85025 COMPLETE CBC W/AUTO DIFF WBC: CPT

## 2024-10-10 PROCEDURE — 83036 HEMOGLOBIN GLYCOSYLATED A1C: CPT

## 2024-10-10 PROCEDURE — G0378: CPT

## 2024-10-10 PROCEDURE — 82962 GLUCOSE BLOOD TEST: CPT

## 2024-10-10 PROCEDURE — 96376 TX/PRO/DX INJ SAME DRUG ADON: CPT

## 2024-10-10 PROCEDURE — 74177 CT ABD & PELVIS W/CONTRAST: CPT | Mod: MC

## 2024-10-10 PROCEDURE — 81001 URINALYSIS AUTO W/SCOPE: CPT

## 2024-10-10 PROCEDURE — 99239 HOSP IP/OBS DSCHRG MGMT >30: CPT

## 2024-10-10 PROCEDURE — 36410 VNPNXR 3YR/> PHY/QHP DX/THER: CPT | Mod: XU

## 2024-10-10 RX ORDER — DOXYCYCLINE HYCLATE 100 MG
1 CAPSULE ORAL
Qty: 14 | Refills: 0
Start: 2024-10-10 | End: 2024-10-16

## 2024-10-10 RX ORDER — ACETAMINOPHEN 325 MG
2 TABLET ORAL
Qty: 80 | Refills: 0
Start: 2024-10-10 | End: 2024-10-19

## 2024-10-10 RX ORDER — OXYCODONE HYDROCHLORIDE 30 MG/1
1 TABLET, FILM COATED, EXTENDED RELEASE ORAL
Qty: 30 | Refills: 0
Start: 2024-10-10 | End: 2024-10-14

## 2024-10-10 RX ADMIN — Medication 100 MILLIGRAM(S): at 11:21

## 2024-10-10 RX ADMIN — Medication 1: at 12:00

## 2024-10-10 RX ADMIN — KETOROLAC TROMETHAMINE 15 MILLIGRAM(S): 10 TABLET, FILM COATED ORAL at 06:11

## 2024-10-10 RX ADMIN — Medication 100 MILLIGRAM(S): at 03:35

## 2024-10-10 RX ADMIN — Medication 100 MILLIGRAM(S): at 11:54

## 2024-10-10 RX ADMIN — Medication 40 MILLIGRAM(S): at 06:13

## 2024-10-10 RX ADMIN — Medication 100 MILLIGRAM(S): at 00:10

## 2024-10-10 NOTE — ED CDU PROVIDER SUBSEQUENT DAY NOTE - HISTORY
No interval changes since initial CDU provider note. Pt without new complaint. NAD VSS. Plan to continue antibiotics and pain control in the setting of infection.- IBRAHIMA Wick

## 2024-10-10 NOTE — ED ADULT NURSE REASSESSMENT NOTE - NS ED NURSE REASSESS COMMENT FT1
Pt received from break coverage ADAM Granados. Pt oriented to CDU & plan of care was discussed. Pt A&O x 4. Pt in CDU for IVF, IV abx, pain/fever control. Pt has a left gluteal abscess, swelling and redness noted, no drainage noted. Pt denies any chills, fever, chest pain, SOB, dizziness or palpitations. V/S stable, pt afebrile,  IV in place, patent and free of signs of infiltration. Pt resting in bed. Safety & comfort measures maintained. Call bell in reach. Will continue to monitor.
Pt received from break coverage ADAM Iqbal. Pt oriented to CDU & plan of care was discussed. Pt A&O x 4. Pt in CDU for IVF, IV abx, pain/fever control. Pt has a left gluteal abscess, drainage, swelling and redness noted. Pt denies any chills, fever, chest pain, SOB, dizziness or palpitations. V/S stable, pt afebrile,  IV in place, patent and free of signs of infiltration. Pt resting in bed. Safety & comfort measures maintained. Call bell in reach. Will continue to monitor.
Pt received from ADAM Montiel at 19:00hrs. Pt A&O x 4. Pt in CDU for IV antibiotics and pain control. Patient c/o pain Lt buttock, IBRAHIMA Wick notified. Oxycodone and IV Tylenol given as ordered with good relief.  IV antibiotics continued. V/S stable, IV 20G Rt AC, patent and free of signs of infiltration. Pt resting in bed. Safety & comfort measures maintained. Call bell in reach. Will continue to monitor.

## 2024-10-10 NOTE — ED CDU PROVIDER SUBSEQUENT DAY NOTE - ATTENDING APP SHARED VISIT CONTRIBUTION OF CARE
Attending Fabian: I performed a history and physical exam of the patient and discussed their management with the JOSE. I have reviewed the JOSE note and agree with the documented findings and plan of care, except as noted. This was a shared visit with an JOSE. I reviewed and verified the documentation and independently performed my own history/exam/medical decision making. My medical decision making and observations are found above. Please refer to any progress notes for updates on clinical course. My notes supersedes the above JOSE note in case of discrepancy Attending Fabian: I performed a history and physical exam of the patient and discussed their management with the JOSE/Resident. I have reviewed the JOSE/Resident note and agree with the documented findings and plan of care, except as noted. This was a shared visit with an JOSE/Resident. I reviewed and verified the documentation and independently performed my own history/exam/medical decision making. My medical decision making and observations are found above. Please refer to any progress notes for updates on clinical course. My notes supersedes the above JOSE/Resident note in case of discrepancy

## 2024-10-10 NOTE — ED CDU PROVIDER SUBSEQUENT DAY NOTE - PROGRESS NOTE DETAILS
Patient reports improvement in pain. WBC better. Plan for another round of IV Abx and reassessment. Patient's pain remained controlled. Received additional doses of antibiotics. On re-examination, area on induration unchanged. Will d/c with strict return precautions. Patient feels comfortable going home. All questions answered Patient reports improvement in pain. WBC better. Plan for another round of IV Abx and reassessment.    Attending Fabian: seen and examined on AM rounds w/ Resident, agree w/ above.

## 2024-10-10 NOTE — ED CDU PROVIDER SUBSEQUENT DAY NOTE - PHYSICAL EXAMINATION
Skin: 4x3 inch fluctuant and indurated lesion to L perineal region/infragluteal fold, +approx 1x1cm.

## 2024-10-10 NOTE — ED CDU PROVIDER SUBSEQUENT DAY NOTE - CLINICAL SUMMARY MEDICAL DECISION MAKING FREE TEXT BOX
Attending Fabian: 53 y/o M w/ PMH of DM, hydradenitis suppurativa, HLD, HTN originally presented to the ED w/ L gluteal abscess. Pt has recurrent skin infections. Had developed pain and drainage from area of the last 3 days. Tried using mupirocin and topical clindamycin when he first developed symptoms which has helped in the past, but did not help. In main ED had labs and imaging. Labs w/ mild leukocytosis and CT w/ cellulitis but no abscess. Pt non toxic on exam, overall no acute distress. Lungs clear. Hr regular. abd nondistended/soft/nontender. L gluteal cleft w/ erythematous area and w/ active purulent drainage. Pt placed in CDU for IV abx, evals, vital monitoring, repeat labs. Will reassess the need for additional interventions as clinically warranted. Refer to any progress notes for updates on clinical course and as a continuation of this MDM.

## 2024-10-11 ENCOUNTER — APPOINTMENT (OUTPATIENT)
Dept: CT IMAGING | Facility: IMAGING CENTER | Age: 54
End: 2024-10-11

## 2024-11-06 ENCOUNTER — APPOINTMENT (OUTPATIENT)
Dept: DERMATOLOGY | Facility: CLINIC | Age: 54
End: 2024-11-06
Payer: COMMERCIAL

## 2024-11-06 DIAGNOSIS — L73.9 FOLLICULAR DISORDER, UNSPECIFIED: ICD-10-CM

## 2024-11-06 DIAGNOSIS — L02.91 CUTANEOUS ABSCESS, UNSPECIFIED: ICD-10-CM

## 2024-11-06 PROCEDURE — 99214 OFFICE O/P EST MOD 30 MIN: CPT

## 2024-11-06 RX ORDER — MUPIROCIN 20 MG/G
2 OINTMENT TOPICAL TWICE DAILY
Qty: 1 | Refills: 1 | Status: ACTIVE | COMMUNITY
Start: 2024-11-06 | End: 1900-01-01

## 2024-11-07 ENCOUNTER — NON-APPOINTMENT (OUTPATIENT)
Age: 54
End: 2024-11-07

## 2024-11-07 VITALS — HEIGHT: 70 IN | BODY MASS INDEX: 27.77 KG/M2 | WEIGHT: 194 LBS

## 2024-11-07 DIAGNOSIS — F17.200 NICOTINE DEPENDENCE, UNSPECIFIED, UNCOMPLICATED: ICD-10-CM

## 2024-11-08 ENCOUNTER — APPOINTMENT (OUTPATIENT)
Dept: CT IMAGING | Facility: CLINIC | Age: 54
End: 2024-11-08
Payer: COMMERCIAL

## 2024-11-08 PROCEDURE — 71271 CT THORAX LUNG CANCER SCR C-: CPT

## 2024-11-11 ENCOUNTER — NON-APPOINTMENT (OUTPATIENT)
Age: 54
End: 2024-11-11

## 2024-11-12 ENCOUNTER — NON-APPOINTMENT (OUTPATIENT)
Age: 54
End: 2024-11-12

## 2024-11-19 NOTE — ED ADULT TRIAGE NOTE - SOURCE OF INFORMATION
Statement Selected
Patient

## 2024-11-27 ENCOUNTER — RX RENEWAL (OUTPATIENT)
Age: 54
End: 2024-11-27

## 2024-12-09 ENCOUNTER — APPOINTMENT (OUTPATIENT)
Dept: ORTHOPEDIC SURGERY | Facility: CLINIC | Age: 54
End: 2024-12-09
Payer: COMMERCIAL

## 2024-12-09 DIAGNOSIS — M65.341 TRIGGER FINGER, RIGHT RING FINGER: ICD-10-CM

## 2024-12-09 DIAGNOSIS — M65.331 TRIGGER FINGER, RIGHT MIDDLE FINGER: ICD-10-CM

## 2024-12-09 PROCEDURE — 99213 OFFICE O/P EST LOW 20 MIN: CPT | Mod: 25

## 2024-12-09 PROCEDURE — 20550 NJX 1 TENDON SHEATH/LIGAMENT: CPT | Mod: F8

## 2024-12-09 PROCEDURE — J3490M: CUSTOM

## 2024-12-11 ENCOUNTER — APPOINTMENT (OUTPATIENT)
Age: 54
End: 2024-12-11
Payer: COMMERCIAL

## 2024-12-11 PROCEDURE — D1110 PROPHYLAXIS - ADULT: CPT

## 2024-12-11 PROCEDURE — D0120: CPT

## 2024-12-11 PROCEDURE — D0274: CPT

## 2024-12-16 ENCOUNTER — RX RENEWAL (OUTPATIENT)
Age: 54
End: 2024-12-16

## 2024-12-26 ENCOUNTER — RX RENEWAL (OUTPATIENT)
Age: 54
End: 2024-12-26

## 2025-01-13 ENCOUNTER — APPOINTMENT (OUTPATIENT)
Dept: INTERNAL MEDICINE | Facility: CLINIC | Age: 55
End: 2025-01-13
Payer: COMMERCIAL

## 2025-01-13 VITALS
HEIGHT: 70 IN | BODY MASS INDEX: 28.49 KG/M2 | DIASTOLIC BLOOD PRESSURE: 71 MMHG | OXYGEN SATURATION: 99 % | WEIGHT: 199 LBS | TEMPERATURE: 97 F | SYSTOLIC BLOOD PRESSURE: 118 MMHG | HEART RATE: 82 BPM

## 2025-01-13 DIAGNOSIS — G31.84 MILD COGNITIVE IMPAIRMENT, SO STATED: ICD-10-CM

## 2025-01-13 DIAGNOSIS — E11.65 TYPE 2 DIABETES MELLITUS WITH HYPERGLYCEMIA: ICD-10-CM

## 2025-01-13 LAB — HBA1C MFR BLD HPLC: 7

## 2025-01-13 PROCEDURE — 99214 OFFICE O/P EST MOD 30 MIN: CPT

## 2025-01-13 PROCEDURE — G2211 COMPLEX E/M VISIT ADD ON: CPT

## 2025-01-13 PROCEDURE — 83036 HEMOGLOBIN GLYCOSYLATED A1C: CPT | Mod: QW

## 2025-01-15 PROBLEM — E11.65 UNCONTROLLED TYPE 2 DIABETES MELLITUS WITH HYPERGLYCEMIA: Status: ACTIVE | Noted: 2025-01-13

## 2025-02-03 ENCOUNTER — RX RENEWAL (OUTPATIENT)
Age: 55
End: 2025-02-03

## 2025-02-24 ENCOUNTER — RX RENEWAL (OUTPATIENT)
Age: 55
End: 2025-02-24

## 2025-03-26 ENCOUNTER — RX RENEWAL (OUTPATIENT)
Age: 55
End: 2025-03-26

## 2025-03-28 ENCOUNTER — RX RENEWAL (OUTPATIENT)
Age: 55
End: 2025-03-28

## 2025-03-29 ENCOUNTER — NON-APPOINTMENT (OUTPATIENT)
Age: 55
End: 2025-03-29

## 2025-04-03 ENCOUNTER — NON-APPOINTMENT (OUTPATIENT)
Age: 55
End: 2025-04-03

## 2025-04-04 ENCOUNTER — NON-APPOINTMENT (OUTPATIENT)
Age: 55
End: 2025-04-04

## 2025-04-05 ENCOUNTER — APPOINTMENT (OUTPATIENT)
Dept: INTERNAL MEDICINE | Facility: CLINIC | Age: 55
End: 2025-04-05
Payer: COMMERCIAL

## 2025-04-05 VITALS
DIASTOLIC BLOOD PRESSURE: 60 MMHG | HEART RATE: 73 BPM | WEIGHT: 187 LBS | HEIGHT: 70 IN | OXYGEN SATURATION: 97 % | SYSTOLIC BLOOD PRESSURE: 99 MMHG | BODY MASS INDEX: 26.77 KG/M2

## 2025-04-05 DIAGNOSIS — E11.65 TYPE 2 DIABETES MELLITUS WITH HYPERGLYCEMIA: ICD-10-CM

## 2025-04-05 LAB — HBA1C MFR BLD HPLC: 6.7

## 2025-04-05 PROCEDURE — 99214 OFFICE O/P EST MOD 30 MIN: CPT

## 2025-04-05 PROCEDURE — G2211 COMPLEX E/M VISIT ADD ON: CPT

## 2025-04-05 PROCEDURE — 83036 HEMOGLOBIN GLYCOSYLATED A1C: CPT | Mod: QW

## 2025-04-07 ENCOUNTER — APPOINTMENT (OUTPATIENT)
Dept: ORTHOPEDIC SURGERY | Facility: CLINIC | Age: 55
End: 2025-04-07
Payer: COMMERCIAL

## 2025-04-07 VITALS — BODY MASS INDEX: 26.77 KG/M2 | WEIGHT: 187 LBS | HEIGHT: 70 IN

## 2025-04-07 DIAGNOSIS — M65.341 TRIGGER FINGER, RIGHT RING FINGER: ICD-10-CM

## 2025-04-07 PROCEDURE — J3490M: CUSTOM

## 2025-04-07 PROCEDURE — 20550 NJX 1 TENDON SHEATH/LIGAMENT: CPT | Mod: F8

## 2025-04-07 PROCEDURE — 99213 OFFICE O/P EST LOW 20 MIN: CPT | Mod: 25

## 2025-04-24 ENCOUNTER — RX RENEWAL (OUTPATIENT)
Age: 55
End: 2025-04-24

## 2025-04-29 ENCOUNTER — RX RENEWAL (OUTPATIENT)
Age: 55
End: 2025-04-29

## 2025-05-05 ENCOUNTER — APPOINTMENT (OUTPATIENT)
Facility: CLINIC | Age: 55
End: 2025-05-05
Payer: COMMERCIAL

## 2025-05-05 VITALS
DIASTOLIC BLOOD PRESSURE: 71 MMHG | HEART RATE: 91 BPM | WEIGHT: 188 LBS | BODY MASS INDEX: 26.92 KG/M2 | HEIGHT: 70 IN | SYSTOLIC BLOOD PRESSURE: 116 MMHG

## 2025-05-05 DIAGNOSIS — R41.89 OTHER SYMPTOMS AND SIGNS INVOLVING COGNITIVE FUNCTIONS AND AWARENESS: ICD-10-CM

## 2025-05-05 PROCEDURE — 99203 OFFICE O/P NEW LOW 30 MIN: CPT

## 2025-05-09 ENCOUNTER — NON-APPOINTMENT (OUTPATIENT)
Age: 55
End: 2025-05-09

## 2025-05-23 ENCOUNTER — NON-APPOINTMENT (OUTPATIENT)
Age: 55
End: 2025-05-23

## 2025-06-04 ENCOUNTER — APPOINTMENT (OUTPATIENT)
Age: 55
End: 2025-06-04
Payer: COMMERCIAL

## 2025-06-04 PROCEDURE — D1110 PROPHYLAXIS - ADULT: CPT

## 2025-06-04 PROCEDURE — D0120: CPT

## 2025-06-13 ENCOUNTER — TRANSCRIPTION ENCOUNTER (OUTPATIENT)
Age: 55
End: 2025-06-13

## 2025-06-19 ENCOUNTER — TRANSCRIPTION ENCOUNTER (OUTPATIENT)
Age: 55
End: 2025-06-19

## 2025-06-22 ENCOUNTER — EMERGENCY (EMERGENCY)
Facility: HOSPITAL | Age: 55
LOS: 1 days | End: 2025-06-22
Attending: STUDENT IN AN ORGANIZED HEALTH CARE EDUCATION/TRAINING PROGRAM
Payer: COMMERCIAL

## 2025-06-22 VITALS
HEART RATE: 79 BPM | RESPIRATION RATE: 20 BRPM | DIASTOLIC BLOOD PRESSURE: 76 MMHG | SYSTOLIC BLOOD PRESSURE: 121 MMHG | TEMPERATURE: 97 F | OXYGEN SATURATION: 100 % | WEIGHT: 184.97 LBS

## 2025-06-22 DIAGNOSIS — Z98.890 OTHER SPECIFIED POSTPROCEDURAL STATES: Chronic | ICD-10-CM

## 2025-06-22 PROCEDURE — 70450 CT HEAD/BRAIN W/O DYE: CPT

## 2025-06-22 PROCEDURE — 99285 EMERGENCY DEPT VISIT HI MDM: CPT

## 2025-06-22 PROCEDURE — 70450 CT HEAD/BRAIN W/O DYE: CPT | Mod: 26

## 2025-06-22 PROCEDURE — 93010 ELECTROCARDIOGRAM REPORT: CPT

## 2025-06-22 PROCEDURE — 72125 CT NECK SPINE W/O DYE: CPT | Mod: 26

## 2025-06-22 PROCEDURE — 93005 ELECTROCARDIOGRAM TRACING: CPT

## 2025-06-22 PROCEDURE — 99284 EMERGENCY DEPT VISIT MOD MDM: CPT | Mod: 25

## 2025-06-22 PROCEDURE — 72125 CT NECK SPINE W/O DYE: CPT

## 2025-06-22 RX ORDER — ACETAMINOPHEN 500 MG/5ML
975 LIQUID (ML) ORAL ONCE
Refills: 0 | Status: COMPLETED | OUTPATIENT
Start: 2025-06-22 | End: 2025-06-22

## 2025-06-22 RX ADMIN — Medication 975 MILLIGRAM(S): at 21:49
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
## 2025-06-22 NOTE — ED PROVIDER NOTE - NSFOLLOWUPINSTRUCTIONS_ED_ALL_ED_FT
Head Injury, Adult  You have received a head injury. It does not appear serious at this time. Headaches and vomiting are common following head injury. It should be easy to awaken from sleeping. Sometimes it is necessary for you to stay in the emergency department for a while for observation. Sometimes admission to the hospital may be needed. After injuries such as yours, most problems occur within the first 24 hours, but side effects may occur up to 7–10 days after the injury. It is important for you to carefully monitor your condition and contact your health care provider or seek immediate medical care if there is a change in your condition.   WHAT ARE THE TYPES OF HEAD INJURIES?  Head injuries can be as minor as a bump. Some head injuries can be more severe. More severe head injuries include:  A jarring injury to the brain (concussion).  A bruise of the brain (contusion). This mean there is bleeding in the brain that can cause swelling.  A cracked skull (skull fracture).  Bleeding in the brain that collects, clots, and forms a bump (hematoma).  WHAT CAUSES A HEAD INJURY?  A serious head injury is most likely to happen to someone who is in a car wreck and is not wearing a seat belt. Other causes of major head injuries include bicycle or motorcycle accidents, sports injuries, and falls.  HOW ARE HEAD INJURIES DIAGNOSED?  A complete history of the event leading to the injury and your current symptoms will be helpful in diagnosing head injuries. Many times, pictures of the brain, such as CT or MRI are needed to see the extent of the injury. Often, an overnight hospital stay is necessary for observation.   WHEN SHOULD I SEEK IMMEDIATE MEDICAL CARE?  You should get help right away if:  You have confusion or drowsiness.   You feel sick to your stomach (nauseous) or have continued, forceful vomiting.  You have dizziness or unsteadiness that is getting worse.  You have severe, continued headaches not relieved by medicine. Only take over-the-counter or prescription medicines for pain, fever, or discomfort as directed by your health care provider.  You do not have normal function of the arms or legs or are unable to walk.  You notice changes in the black spots in the center of the colored part of your eye (pupil).  You have a clear or bloody fluid coming from your nose or ears.  You have a loss of vision.    During the next 24 hours after the injury, you must stay with someone who can watch you for the warning signs. This person should contact local emergency services (911 in the [place] if you have seizures, you become unconscious, or you are unable to wake up.    HOW CAN I PREVENT A HEAD INJURY IN THE FUTURE?  The most important factor for preventing major head injuries is avoiding motor vehicle accidents.  To minimize the potential for damage to your head, it is crucial to wear seat belts while riding in motor vehicles. Wearing helmets while bike riding and playing collision sports (like football) is also helpful. Also, avoiding dangerous activities around the house will further help reduce your risk of head injury.     WHEN CAN I RETURN TO NORMAL ACTIVITIES AND ATHLETICS?  You should be reevaluated by your health care provider before returning to these activities. If you have any of the following symptoms, you should not return to activities or contact sports until 1 week after the symptoms have stopped:    Persistent headache.  Dizziness or vertigo.  Poor attention and concentration.  Confusion.  Memory problems.  Nausea or vomiting.  Fatigue or tire easily.  Irritability.  Intolerant of bright lights or loud noises.  Anxiety or depression.  Disturbed sleep.     MAKE SURE YOU:  Understand these instructions.   Will watch your condition.  Will get help right away if you are not doing well or get worse.  ADDITIONAL NOTES AND INSTRUCTIONS  Please follow up with your Primary MD in 24-48 hr.  Seek immediate medical care for any new/worsening signs or symptoms. Please follow up with concussion clinic ([phone]) and spine clinic ([phone]) for reevaluation, call Monday for appointment.     Head Injury, Adult  You have received a head injury. It does not appear serious at this time. Headaches and vomiting are common following head injury. It should be easy to awaken from sleeping. Sometimes it is necessary for you to stay in the emergency department for a while for observation. Sometimes admission to the hospital may be needed. After injuries such as yours, most problems occur within the first 24 hours, but side effects may occur up to 7–10 days after the injury. It is important for you to carefully monitor your condition and contact your health care provider or seek immediate medical care if there is a change in your condition.   WHAT ARE THE TYPES OF HEAD INJURIES?  Head injuries can be as minor as a bump. Some head injuries can be more severe. More severe head injuries include:  A jarring injury to the brain (concussion).  A bruise of the brain (contusion). This mean there is bleeding in the brain that can cause swelling.  A cracked skull (skull fracture).  Bleeding in the brain that collects, clots, and forms a bump (hematoma).  WHAT CAUSES A HEAD INJURY?  A serious head injury is most likely to happen to someone who is in a car wreck and is not wearing a seat belt. Other causes of major head injuries include bicycle or motorcycle accidents, sports injuries, and falls.  HOW ARE HEAD INJURIES DIAGNOSED?  A complete history of the event leading to the injury and your current symptoms will be helpful in diagnosing head injuries. Many times, pictures of the brain, such as CT or MRI are needed to see the extent of the injury. Often, an overnight hospital stay is necessary for observation.   WHEN SHOULD I SEEK IMMEDIATE MEDICAL CARE?  You should get help right away if:  You have confusion or drowsiness.   You feel sick to your stomach (nauseous) or have continued, forceful vomiting.  You have dizziness or unsteadiness that is getting worse.  You have severe, continued headaches not relieved by medicine. Only take over-the-counter or prescription medicines for pain, fever, or discomfort as directed by your health care provider.  You do not have normal function of the arms or legs or are unable to walk.  You notice changes in the black spots in the center of the colored part of your eye (pupil).  You have a clear or bloody fluid coming from your nose or ears.  You have a loss of vision.    During the next 24 hours after the injury, you must stay with someone who can watch you for the warning signs. This person should contact local emergency services (911 in the [place] if you have seizures, you become unconscious, or you are unable to wake up.    HOW CAN I PREVENT A HEAD INJURY IN THE FUTURE?  The most important factor for preventing major head injuries is avoiding motor vehicle accidents.  To minimize the potential for damage to your head, it is crucial to wear seat belts while riding in motor vehicles. Wearing helmets while bike riding and playing collision sports (like football) is also helpful. Also, avoiding dangerous activities around the house will further help reduce your risk of head injury.     WHEN CAN I RETURN TO NORMAL ACTIVITIES AND ATHLETICS?  You should be reevaluated by your health care provider before returning to these activities. If you have any of the following symptoms, you should not return to activities or contact sports until 1 week after the symptoms have stopped:    Persistent headache.  Dizziness or vertigo.  Poor attention and concentration.  Confusion.  Memory problems.  Nausea or vomiting.  Fatigue or tire easily.  Irritability.  Intolerant of bright lights or loud noises.  Anxiety or depression.  Disturbed sleep.     MAKE SURE YOU:  Understand these instructions.   Will watch your condition.  Will get help right away if you are not doing well or get worse.  ADDITIONAL NOTES AND INSTRUCTIONS  Please follow up with your Primary MD in 24-48 hr.  Seek immediate medical care for any new/worsening signs or symptoms.
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
## 2025-06-22 NOTE — ED PROVIDER NOTE - PATIENT PORTAL LINK FT
You can access the FollowMyHealth Patient Portal offered by [facility] by registering at the following website: [email or web address]. By joining [facility]’s FollowMyHealth portal, you will also be able to view your health information using other applications (apps) compatible with our system.
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
## 2025-06-22 NOTE — ED PROVIDER NOTE - PHYSICAL EXAMINATION
NAD. VSS. Afebrile. +PERRL, EOMI. Vision acuity 20/20 bilaterally. No facial or scalp swelling/lesion. Lungs clear. No spinal midline tender. B/L cervical trapezium tender. No chest wall swelling or lesion. ABD soft, non tender. No CVA tender. No hip tender. Neuro intact with steady gait.

## 2025-06-23 VITALS
RESPIRATION RATE: 18 BRPM | DIASTOLIC BLOOD PRESSURE: 71 MMHG | HEART RATE: 68 BPM | SYSTOLIC BLOOD PRESSURE: 122 MMHG | OXYGEN SATURATION: 97 %

## 2025-07-01 ENCOUNTER — TRANSCRIPTION ENCOUNTER (OUTPATIENT)
Age: 55
End: 2025-07-01

## 2025-07-15 ENCOUNTER — TRANSCRIPTION ENCOUNTER (OUTPATIENT)
Age: 55
End: 2025-07-15

## 2025-07-27 NOTE — ED CDU PROVIDER DISPOSITION NOTE - NS_CDULENGTHOFSTAY_ED_A_ED
Goal Outcome Evaluation:    Alert and oriented x4. Able to make needs known. A1 w/ walker/gb. BP this shift were WNL. 10pm BG was 295, was the highest BG this shift. SO, Family and patient had many question about BP and BG. Denies pain this time. Mod CCHO, thin/whole. Continent of BB. Cont. With plan of care.       10 Hour(s) 46 Minute(s) 18 hours

## 2025-08-07 ENCOUNTER — APPOINTMENT (OUTPATIENT)
Dept: UROLOGY | Facility: CLINIC | Age: 55
End: 2025-08-07

## 2025-08-12 ENCOUNTER — LABORATORY RESULT (OUTPATIENT)
Age: 55
End: 2025-08-12

## 2025-08-12 ENCOUNTER — APPOINTMENT (OUTPATIENT)
Dept: UROLOGY | Facility: CLINIC | Age: 55
End: 2025-08-12
Payer: COMMERCIAL

## 2025-08-12 VITALS
OXYGEN SATURATION: 100 % | HEIGHT: 70 IN | WEIGHT: 183 LBS | SYSTOLIC BLOOD PRESSURE: 109 MMHG | HEART RATE: 72 BPM | DIASTOLIC BLOOD PRESSURE: 71 MMHG | TEMPERATURE: 97.7 F | BODY MASS INDEX: 26.2 KG/M2

## 2025-08-12 DIAGNOSIS — N52.9 MALE ERECTILE DYSFUNCTION, UNSPECIFIED: ICD-10-CM

## 2025-08-12 DIAGNOSIS — N48.6 INDURATION PENIS PLASTICA: ICD-10-CM

## 2025-08-12 DIAGNOSIS — R35.1 NOCTURIA: ICD-10-CM

## 2025-08-12 DIAGNOSIS — N47.6 BALANOPOSTHITIS: ICD-10-CM

## 2025-08-12 PROCEDURE — 99214 OFFICE O/P EST MOD 30 MIN: CPT

## 2025-08-12 PROCEDURE — 51741 ELECTRO-UROFLOWMETRY FIRST: CPT

## 2025-08-12 PROCEDURE — 51798 US URINE CAPACITY MEASURE: CPT

## 2025-08-12 RX ORDER — OFLOXACIN OTIC 3 MG/ML
0.3 SOLUTION AURICULAR (OTIC)
Qty: 10 | Refills: 0 | Status: DISCONTINUED | COMMUNITY
Start: 2025-03-29

## 2025-08-12 RX ORDER — VALACYCLOVIR 1 G/1
1 TABLET, FILM COATED ORAL
Qty: 4 | Refills: 0 | Status: DISCONTINUED | COMMUNITY
Start: 2025-05-23 | End: 2025-08-12

## 2025-08-13 LAB
APPEARANCE: ABNORMAL
BILIRUBIN URINE: ABNORMAL
BLOOD URINE: NEGATIVE
COLOR: NORMAL
GLUCOSE QUALITATIVE U: NEGATIVE MG/DL
KETONES URINE: ABNORMAL MG/DL
LEUKOCYTE ESTERASE URINE: NEGATIVE
NITRITE URINE: NEGATIVE
PH URINE: 5.5
PROTEIN URINE: NEGATIVE MG/DL
PSA SERPL-MCNC: 1.57 NG/ML
SPECIFIC GRAVITY URINE: >1.03
UROBILINOGEN URINE: 1 MG/DL

## 2025-09-10 ENCOUNTER — APPOINTMENT (OUTPATIENT)
Dept: NEUROLOGY | Facility: CLINIC | Age: 55
End: 2025-09-10

## 2025-09-10 VITALS
WEIGHT: 192 LBS | DIASTOLIC BLOOD PRESSURE: 75 MMHG | BODY MASS INDEX: 27.49 KG/M2 | HEIGHT: 70 IN | HEART RATE: 82 BPM | SYSTOLIC BLOOD PRESSURE: 118 MMHG

## 2025-09-10 DIAGNOSIS — R41.89 OTHER SYMPTOMS AND SIGNS INVOLVING COGNITIVE FUNCTIONS AND AWARENESS: ICD-10-CM

## 2025-09-10 DIAGNOSIS — G31.84 MILD COGNITIVE IMPAIRMENT, SO STATED: ICD-10-CM

## 2025-09-10 PROCEDURE — 99205 OFFICE O/P NEW HI 60 MIN: CPT

## 2025-09-10 PROCEDURE — G2211 COMPLEX E/M VISIT ADD ON: CPT

## 2025-09-15 LAB
24R-OH-CALCIDIOL SERPL-MCNC: 36.7 PG/ML
ALBUMIN SERPL ELPH-MCNC: 4.8 G/DL
ALP BLD-CCNC: 69 U/L
ALT SERPL-CCNC: 31 U/L
ANION GAP SERPL CALC-SCNC: 14 MMOL/L
AST SERPL-CCNC: 33 U/L
B BURGDOR AB SER-IMP: NEGATIVE
B BURGDOR IGG+IGM SER QL: 0.16 INDEX
BASOPHILS # BLD AUTO: 0.02 K/UL
BASOPHILS NFR BLD AUTO: 0.4 %
BILIRUB SERPL-MCNC: 0.6 MG/DL
BUN SERPL-MCNC: 17 MG/DL
CALCIUM SERPL-MCNC: 10.1 MG/DL
CHLORIDE SERPL-SCNC: 103 MMOL/L
CHOLEST SERPL-MCNC: 163 MG/DL
CO2 SERPL-SCNC: 23 MMOL/L
CREAT SERPL-MCNC: 0.75 MG/DL
CRP SERPL-MCNC: <3 MG/L
EGFRCR SERPLBLD CKD-EPI 2021: 106 ML/MIN/1.73M2
EOSINOPHIL # BLD AUTO: 0.18 K/UL
EOSINOPHIL NFR BLD AUTO: 3.5 %
ERYTHROCYTE [SEDIMENTATION RATE] IN BLOOD BY WESTERGREN METHOD: 7 MM/HR
ESTIMATED AVERAGE GLUCOSE: 131 MG/DL
FOLATE SERPL-MCNC: 12.4 NG/ML
GLUCOSE SERPL-MCNC: 120 MG/DL
HBA1C MFR BLD HPLC: 6.2 %
HCT VFR BLD CALC: 42.4 %
HDLC SERPL-MCNC: 36 MG/DL
HGB BLD-MCNC: 13.7 G/DL
HIV1+2 AB SPEC QL IA.RAPID: NONREACTIVE
HOMOCYSTEINE LEVEL: 10.9 UMOL/L
IMM GRANULOCYTES NFR BLD AUTO: 0.2 %
LDLC SERPL-MCNC: 110 MG/DL
LYMPHOCYTES # BLD AUTO: 1.77 K/UL
LYMPHOCYTES NFR BLD AUTO: 34.3 %
MAN DIFF?: NORMAL
MCHC RBC-ENTMCNC: 29.8 PG
MCHC RBC-ENTMCNC: 32.3 G/DL
MCV RBC AUTO: 92.2 FL
METHYLMALONATE SERPL-SCNC: 142 NMOL/L
MONOCYTES # BLD AUTO: 0.38 K/UL
MONOCYTES NFR BLD AUTO: 7.4 %
NEUTROPHILS # BLD AUTO: 2.8 K/UL
NEUTROPHILS NFR BLD AUTO: 54.2 %
NONHDLC SERPL-MCNC: 128 MG/DL
PLATELET # BLD AUTO: 228 K/UL
POTASSIUM SERPL-SCNC: 4.9 MMOL/L
PROT SERPL-MCNC: 7.2 G/DL
RBC # BLD: 4.6 M/UL
RBC # FLD: 12.6 %
SODIUM SERPL-SCNC: 140 MMOL/L
T PALLIDUM AB SER QL IA: NEGATIVE
T3FREE SERPL-MCNC: 3.7 PG/ML
T4 FREE SERPL-MCNC: 1.2 NG/DL
TRIGL SERPL-MCNC: 94 MG/DL
TSH SERPL-ACNC: 1.62 UIU/ML
VIT B1 SERPL-MCNC: 166 NMOL/L
VIT B12 SERPL-MCNC: 765 PG/ML
WBC # FLD AUTO: 5.16 K/UL

## 2025-09-17 LAB
APOE INTERPRETATION: NORMAL
APOE REASON FOR REFERRAL: NORMAL
APOE RELEASED BY: NORMAL
APOE RESULT SUMMARY: NORMAL
APOE RESULT: NORMAL
APOE SPECIMEN: NORMAL